# Patient Record
Sex: FEMALE | Race: ASIAN | NOT HISPANIC OR LATINO
[De-identification: names, ages, dates, MRNs, and addresses within clinical notes are randomized per-mention and may not be internally consistent; named-entity substitution may affect disease eponyms.]

---

## 2024-09-12 ENCOUNTER — NON-APPOINTMENT (OUTPATIENT)
Age: 24
End: 2024-09-12

## 2024-09-12 PROBLEM — Z00.00 ENCOUNTER FOR PREVENTIVE HEALTH EXAMINATION: Status: ACTIVE | Noted: 2024-09-12

## 2024-09-13 ENCOUNTER — APPOINTMENT (OUTPATIENT)
Dept: OBGYN | Facility: CLINIC | Age: 24
End: 2024-09-13

## 2024-09-13 ENCOUNTER — ASOB RESULT (OUTPATIENT)
Age: 24
End: 2024-09-13

## 2024-09-13 VITALS
DIASTOLIC BLOOD PRESSURE: 66 MMHG | HEIGHT: 63 IN | SYSTOLIC BLOOD PRESSURE: 100 MMHG | BODY MASS INDEX: 30.48 KG/M2 | WEIGHT: 172 LBS | HEART RATE: 76 BPM

## 2024-09-13 DIAGNOSIS — Z83.438 FAMILY HISTORY OF OTHER DISORDER OF LIPOPROTEIN METABOLISM AND OTHER LIPIDEMIA: ICD-10-CM

## 2024-09-13 DIAGNOSIS — Z83.1 FAMILY HISTORY OF OTHER INFECTIOUS AND PARASITIC DISEASES: ICD-10-CM

## 2024-09-13 DIAGNOSIS — Z87.42 PERSONAL HISTORY OF OTHER DISEASES OF THE FEMALE GENITAL TRACT: ICD-10-CM

## 2024-09-13 DIAGNOSIS — N76.0 ACUTE VAGINITIS: ICD-10-CM

## 2024-09-13 DIAGNOSIS — Z78.9 OTHER SPECIFIED HEALTH STATUS: ICD-10-CM

## 2024-09-13 DIAGNOSIS — Z83.3 FAMILY HISTORY OF DIABETES MELLITUS: ICD-10-CM

## 2024-09-13 DIAGNOSIS — Z82.49 FAMILY HISTORY OF ISCHEMIC HEART DISEASE AND OTHER DISEASES OF THE CIRCULATORY SYSTEM: ICD-10-CM

## 2024-09-13 DIAGNOSIS — Z34.90 ENCOUNTER FOR SUPERVISION OF NORMAL PREGNANCY, UNSPECIFIED, UNSPECIFIED TRIMESTER: ICD-10-CM

## 2024-09-13 DIAGNOSIS — Z82.3 FAMILY HISTORY OF STROKE: ICD-10-CM

## 2024-09-13 PROCEDURE — ZZZZZ: CPT

## 2024-09-13 PROCEDURE — G0444 DEPRESSION SCREEN ANNUAL: CPT | Mod: 59

## 2024-09-13 PROCEDURE — 76801 OB US < 14 WKS SINGLE FETUS: CPT

## 2024-09-13 PROCEDURE — 99385 PREV VISIT NEW AGE 18-39: CPT | Mod: 25

## 2024-09-13 PROCEDURE — 99203 OFFICE O/P NEW LOW 30 MIN: CPT | Mod: 25

## 2024-09-13 RX ORDER — ESTRADIOL 10 UG/1
TABLET, FILM COATED VAGINAL
Refills: 0 | Status: ACTIVE | COMMUNITY

## 2024-09-13 RX ORDER — RANITIDINE 150 MG/1
TABLET, FILM COATED ORAL
Refills: 0 | Status: ACTIVE | COMMUNITY

## 2024-09-13 RX ORDER — PNV/FERROUS SULFATE/FOLIC ACID 27-<0.5MG
TABLET ORAL
Refills: 0 | Status: ACTIVE | COMMUNITY

## 2024-09-13 RX ORDER — PROGESTERONE 50 MG/ML
INJECTION, SOLUTION INTRAMUSCULAR
Refills: 0 | Status: ACTIVE | COMMUNITY

## 2024-09-14 ENCOUNTER — TRANSCRIPTION ENCOUNTER (OUTPATIENT)
Age: 24
End: 2024-09-14

## 2024-09-16 ENCOUNTER — NON-APPOINTMENT (OUTPATIENT)
Age: 24
End: 2024-09-16

## 2024-09-16 LAB
ABO + RH PNL BLD: NORMAL
BACTERIA UR CULT: NORMAL
BILIRUB UR QL STRIP: NORMAL
BLD GP AB SCN SERPL QL: NORMAL
CLARITY UR: CLEAR
CMV IGM SERPL QL: <8 AU/ML
CMV IGM SERPL QL: NEGATIVE
COLLECTION METHOD: NORMAL
ESTIMATED AVERAGE GLUCOSE: 103 MG/DL
GLUCOSE UR-MCNC: NORMAL
HBA1C MFR BLD HPLC: 5.2 %
HBV SURFACE AG SER QL: NONREACTIVE
HCG UR QL: 0.2 EU/DL
HCV AB SER QL: NONREACTIVE
HCV S/CO RATIO: 0.12 S/CO
HGB A MFR BLD: 97.2 %
HGB A2 MFR BLD: 2.8 %
HGB FRACT BLD-IMP: NORMAL
HGB UR QL STRIP.AUTO: NORMAL
HIV1+2 AB SPEC QL IA.RAPID: NONREACTIVE
KETONES UR-MCNC: NORMAL
LEAD BLD-MCNC: <1 UG/DL
LEUKOCYTE ESTERASE UR QL STRIP: NORMAL
MEV IGG FLD QL IA: 266 AU/ML
MEV IGG+IGM SER-IMP: POSITIVE
MUV AB SER-ACNC: POSITIVE
MUV IGG SER QL IA: 91.3 AU/ML
NITRITE UR QL STRIP: NORMAL
PH UR STRIP: 6
PROT UR STRIP-MCNC: NORMAL
RUBV IGG FLD-ACNC: 3.43 INDEX
RUBV IGG SER-IMP: POSITIVE
SP GR UR STRIP: 1.01
T GONDII AB SER-IMP: NEGATIVE
T GONDII AB SER-IMP: NEGATIVE
T GONDII IGG SER QL: <3 IU/ML
T GONDII IGM SER QL: <3 AU/ML
T PALLIDUM AB SER QL IA: NEGATIVE
TSH SERPL-ACNC: 1.67 UIU/ML
VZV AB TITR SER: POSITIVE
VZV IGG SER IF-ACNC: 7.83 S/CO

## 2024-09-17 ENCOUNTER — NON-APPOINTMENT (OUTPATIENT)
Age: 24
End: 2024-09-17

## 2024-09-17 LAB
A VAGINAE DNA VAG QL NAA+PROBE: NORMAL
BVAB2 DNA VAG QL NAA+PROBE: NORMAL
C KRUSEI DNA VAG QL NAA+PROBE: NEGATIVE
C TRACH RRNA SPEC QL NAA+PROBE: NEGATIVE
C TRACH RRNA SPEC QL NAA+PROBE: NOT DETECTED
CANDIDA DNA VAG QL NAA+PROBE: NEGATIVE
MEGA1 DNA VAG QL NAA+PROBE: NORMAL
N GONORRHOEA RRNA SPEC QL NAA+PROBE: NEGATIVE
N GONORRHOEA RRNA SPEC QL NAA+PROBE: NOT DETECTED
SOURCE AMPLIFICATION: NORMAL
T VAGINALIS RRNA SPEC QL NAA+PROBE: NEGATIVE

## 2024-09-18 LAB
25(OH)D3 SERPL-MCNC: 19.3 NG/ML
ALBUMIN SERPL ELPH-MCNC: 4.5 G/DL
ALP BLD-CCNC: 61 U/L
ALT SERPL-CCNC: 42 U/L
ANION GAP SERPL CALC-SCNC: 14 MMOL/L
AST SERPL-CCNC: 27 U/L
B19V IGG SER QL IA: 4.57 INDEX
B19V IGG+IGM SER-IMP: NORMAL
B19V IGG+IGM SER-IMP: POSITIVE
B19V IGM FLD-ACNC: 0.32 INDEX
B19V IGM SER-ACNC: NEGATIVE
BASOPHILS # BLD AUTO: 0.03 K/UL
BASOPHILS NFR BLD AUTO: 0.3 %
BILIRUB SERPL-MCNC: 0.4 MG/DL
BUN SERPL-MCNC: 12 MG/DL
CALCIUM SERPL-MCNC: 9.9 MG/DL
CHLORIDE SERPL-SCNC: 101 MMOL/L
CMV IGG SERPL QL: 8.3 U/ML
CMV IGG SERPL-IMP: POSITIVE
CO2 SERPL-SCNC: 21 MMOL/L
CREAT SERPL-MCNC: 0.61 MG/DL
EGFR: 127 ML/MIN/1.73M2
EOSINOPHIL # BLD AUTO: 0.29 K/UL
EOSINOPHIL NFR BLD AUTO: 2.7 %
GLUCOSE SERPL-MCNC: 81 MG/DL
HCT VFR BLD CALC: 36.4 %
HGB BLD-MCNC: 11.9 G/DL
IMM GRANULOCYTES NFR BLD AUTO: 0.3 %
LYMPHOCYTES # BLD AUTO: 2.49 K/UL
LYMPHOCYTES NFR BLD AUTO: 23.3 %
MAN DIFF?: NORMAL
MCHC RBC-ENTMCNC: 29.3 PG
MCHC RBC-ENTMCNC: 32.7 GM/DL
MCV RBC AUTO: 89.7 FL
MONOCYTES # BLD AUTO: 0.57 K/UL
MONOCYTES NFR BLD AUTO: 5.3 %
NEUTROPHILS # BLD AUTO: 7.27 K/UL
NEUTROPHILS NFR BLD AUTO: 68.1 %
PLATELET # BLD AUTO: 294 K/UL
POTASSIUM SERPL-SCNC: 4.6 MMOL/L
PROT SERPL-MCNC: 7.3 G/DL
RBC # BLD: 4.06 M/UL
RBC # FLD: 13 %
SODIUM SERPL-SCNC: 135 MMOL/L
WBC # FLD AUTO: 10.68 K/UL

## 2024-09-19 LAB
AR GENE MUT ANL BLD/T: NORMAL
M TB IFN-G BLD-IMP: POSITIVE
QUANTIFERON TB PLUS MITOGEN MINUS NIL: 8.11 IU/ML
QUANTIFERON TB PLUS NIL: 0.02 IU/ML
QUANTIFERON TB PLUS TB1 MINUS NIL: 0.32 IU/ML
QUANTIFERON TB PLUS TB2 MINUS NIL: 0.44 IU/ML

## 2024-09-20 LAB
CFTR MUT TESTED BLD/T: NEGATIVE
FMR1 GENE MUT ANL BLD/T: NORMAL

## 2024-09-22 PROBLEM — Z83.1 FAMILY HISTORY OF TYPE B VIRAL HEPATITIS: Status: ACTIVE | Noted: 2024-09-13

## 2024-09-22 PROBLEM — Z78.9 DOES NOT USE ILLICIT DRUGS: Status: ACTIVE | Noted: 2024-09-13

## 2024-09-22 PROBLEM — Z78.9 NON-SMOKER: Status: ACTIVE | Noted: 2024-09-13

## 2024-09-22 PROBLEM — Z83.3 FAMILY HISTORY OF DIABETES MELLITUS: Status: ACTIVE | Noted: 2024-09-13

## 2024-09-22 PROBLEM — Z83.438 FAMILY HISTORY OF HYPERLIPIDEMIA: Status: ACTIVE | Noted: 2024-09-13

## 2024-09-22 PROBLEM — Z78.9 DENIES ALCOHOL CONSUMPTION: Status: ACTIVE | Noted: 2024-09-13

## 2024-09-22 PROBLEM — Z82.3 FAMILY HISTORY OF CEREBROVASCULAR ACCIDENT (CVA): Status: ACTIVE | Noted: 2024-09-13

## 2024-09-22 PROBLEM — Z82.49 FAMILY HISTORY OF HYPERTENSION: Status: ACTIVE | Noted: 2024-09-13

## 2024-09-22 PROBLEM — Z87.42 HISTORY OF DYSMENORRHEA: Status: RESOLVED | Noted: 2024-09-13 | Resolved: 2024-09-22

## 2024-09-22 NOTE — PHYSICAL EXAM
[Appropriately responsive] : appropriately responsive [Alert] : alert [No Acute Distress] : no acute distress [No Lymphadenopathy] : no lymphadenopathy [Regular Rate Rhythm] : regular rate rhythm [No Murmurs] : no murmurs [Clear to Auscultation B/L] : clear to auscultation bilaterally [Soft] : soft [Non-tender] : non-tender [Non-distended] : non-distended [Oriented x3] : oriented x3 [Examination Of The Breasts] : a normal appearance [Breast Palpation Diffuse Fibrous Tissue Bilateral] : fibrocystic changes [No Masses] : no breast masses were palpable [Labia Majora] : normal [Discharge] : a  ~M vaginal discharge was present [Scant] : scant [White] : white [Thin] : thin [Normal] : normal [Uterine Adnexae] : non-palpable [FreeTextEntry6] : no masses no tenderness no adenopathy.

## 2024-09-22 NOTE — PHYSICAL EXAM
[Appropriately responsive] : appropriately responsive [Alert] : alert [No Acute Distress] : no acute distress [No Lymphadenopathy] : no lymphadenopathy [Regular Rate Rhythm] : regular rate rhythm [No Murmurs] : no murmurs [Clear to Auscultation B/L] : clear to auscultation bilaterally [Soft] : soft [Non-tender] : non-tender [Non-distended] : non-distended [Oriented x3] : oriented x3 [Breast Palpation Diffuse Fibrous Tissue Bilateral] : fibrocystic changes [Examination Of The Breasts] : a normal appearance [No Masses] : no breast masses were palpable [Labia Majora] : normal [Discharge] : a  ~M vaginal discharge was present [Scant] : scant [White] : white [Thin] : thin [Normal] : normal [Uterine Adnexae] : non-palpable [FreeTextEntry6] : no masses no tenderness no adenopathy.

## 2024-09-22 NOTE — PLAN
[FreeTextEntry1] : Patient for initial visit  IVF pregancy TV sonogram showed (+) Fetal viability 8 wks 4 d and TFT=189  s/p Left salpingectomy and told normal Rt tube then noted blocked 2024 and IVF pregnancy. Patient screened for depression - no signs of clinical depression. Reviewed over the course of the visit 5-10 minutes of face-to-face time. PHQ-2 on file. Follow up in 2-4 weeks to check FH  Diet discussion- Include green vegetables, avoid uncooked food, avoid raw meat and fish and told may continue Pepsi for acid reflux.  The patient has a history of adhesions, we discussed a vaginal delivery will be ideal and to do  only if needed and indicated, not electively.

## 2024-09-22 NOTE — HISTORY OF PRESENT ILLNESS
[Patient reported PAP Smear was normal] : Patient reported PAP Smear was normal [N] : Patient does not use contraception [Y] : Positive pregnancy history [No] : Patient does not have concerns regarding sex [Currently Active] : currently active [Men] : men [TextBox_4] : 25 yo presents initial visit. Her current issue is nausea. She has been on Pepsi for acid reflux. She has a past history of laparoscopic procedure for ectopic pregnancy in 2023 with post-surgical complication of bowel obstruction, requiring another surgery. Results revealed adhesions around the liver. She is  and currently unemployed due to pregnancy. IVF pregnancy. need to check for fetal viability  [LMPDate] : 7/16/24 [PapSmeardate] : 7/20/23 [PGxTotal] : 1 [PGxEctopic] : 1 [FreeTextEntry1] : 7/16/24

## 2024-09-22 NOTE — PLAN
[FreeTextEntry1] : Patient for initial visit  IVF pregancy TV sonogram showed (+) Fetal viability 8 wks 4 d and PDV=937  s/p Left salpingectomy and told normal Rt tube then noted blocked 2024 and IVF pregnancy. Patient screened for depression - no signs of clinical depression. Reviewed over the course of the visit 5-10 minutes of face-to-face time. PHQ-2 on file. Follow up in 2-4 weeks to check FH  Diet discussion- Include green vegetables, avoid uncooked food, avoid raw meat and fish and told may continue Pepsi for acid reflux.  The patient has a history of adhesions, we discussed a vaginal delivery will be ideal and to do  only if needed and indicated, not electively.

## 2024-09-25 DIAGNOSIS — R76.12 NONSPECIFIC REACTION TO CELL MEDIATED IMMUNITY MEASUREMENT OF GAMMA INTERFERON ANTIGEN RESPONSE W/OUT ACTIVE TUBERCULOSIS: ICD-10-CM

## 2024-10-02 ENCOUNTER — APPOINTMENT (OUTPATIENT)
Dept: OBGYN | Facility: CLINIC | Age: 24
End: 2024-10-02

## 2024-10-02 VITALS
HEART RATE: 92 BPM | DIASTOLIC BLOOD PRESSURE: 71 MMHG | WEIGHT: 172 LBS | SYSTOLIC BLOOD PRESSURE: 106 MMHG | BODY MASS INDEX: 30.48 KG/M2 | HEIGHT: 63 IN

## 2024-10-02 PROCEDURE — 0502F SUBSEQUENT PRENATAL CARE: CPT

## 2024-10-02 PROCEDURE — 99213 OFFICE O/P EST LOW 20 MIN: CPT

## 2024-10-11 ENCOUNTER — LABORATORY RESULT (OUTPATIENT)
Age: 24
End: 2024-10-11

## 2024-10-11 ENCOUNTER — ASOB RESULT (OUTPATIENT)
Age: 24
End: 2024-10-11

## 2024-10-11 ENCOUNTER — APPOINTMENT (OUTPATIENT)
Dept: ANTEPARTUM | Facility: CLINIC | Age: 24
End: 2024-10-11
Payer: COMMERCIAL

## 2024-10-11 PROCEDURE — 76801 OB US < 14 WKS SINGLE FETUS: CPT

## 2024-10-11 PROCEDURE — 36415 COLL VENOUS BLD VENIPUNCTURE: CPT

## 2024-10-11 PROCEDURE — 76813 OB US NUCHAL MEAS 1 GEST: CPT

## 2024-10-21 ENCOUNTER — NON-APPOINTMENT (OUTPATIENT)
Age: 24
End: 2024-10-21

## 2024-10-30 ENCOUNTER — NON-APPOINTMENT (OUTPATIENT)
Age: 24
End: 2024-10-30

## 2024-10-30 ENCOUNTER — APPOINTMENT (OUTPATIENT)
Dept: PULMONOLOGY | Facility: CLINIC | Age: 24
End: 2024-10-30
Payer: COMMERCIAL

## 2024-10-30 VITALS — DIASTOLIC BLOOD PRESSURE: 76 MMHG | SYSTOLIC BLOOD PRESSURE: 112 MMHG | OXYGEN SATURATION: 99 % | HEART RATE: 89 BPM

## 2024-10-30 DIAGNOSIS — R76.12 NONSPECIFIC REACTION TO CELL MEDIATED IMMUNITY MEASUREMENT OF GAMMA INTERFERON ANTIGEN RESPONSE W/OUT ACTIVE TUBERCULOSIS: ICD-10-CM

## 2024-10-30 PROCEDURE — 71045 X-RAY EXAM CHEST 1 VIEW: CPT

## 2024-10-30 PROCEDURE — 99204 OFFICE O/P NEW MOD 45 MIN: CPT

## 2024-11-06 ENCOUNTER — APPOINTMENT (OUTPATIENT)
Dept: OBGYN | Facility: CLINIC | Age: 24
End: 2024-11-06
Payer: COMMERCIAL

## 2024-11-06 VITALS
SYSTOLIC BLOOD PRESSURE: 98 MMHG | BODY MASS INDEX: 27.32 KG/M2 | WEIGHT: 170 LBS | HEART RATE: 81 BPM | HEIGHT: 66 IN | DIASTOLIC BLOOD PRESSURE: 65 MMHG

## 2024-11-06 DIAGNOSIS — Z3A.16 16 WEEKS GESTATION OF PREGNANCY: ICD-10-CM

## 2024-11-06 PROCEDURE — 99213 OFFICE O/P EST LOW 20 MIN: CPT

## 2024-11-06 PROCEDURE — 0502F SUBSEQUENT PRENATAL CARE: CPT

## 2024-11-11 ENCOUNTER — NON-APPOINTMENT (OUTPATIENT)
Age: 24
End: 2024-11-11

## 2024-11-11 LAB
AFP INTERP SERPL-IMP: NORMAL
AFP INTERP SERPL-IMP: NORMAL
AFP MOM CUT-OFF: 2.5
AFP MOM SERPL: 1.68
AFP PERCENTILE: 94.7
AFP SERPL-ACNC: 53.87 NG/ML
CARBAMAZEPINE?: NO
CURRENT SMOKER: NORMAL
DIABETES STATUS PATIENT: NORMAL
GA: NORMAL
GESTATIONAL AGE METHOD: NORMAL
HX OF NTD NARR: NORMAL
MULTIPLE PREGNANCY: NORMAL
NEURAL TUBE DEFECT RISK FETUS: NORMAL
NEURAL TUBE DEFECT RISK POP: NORMAL
RECOM F/U: NO
TEST PERFORMANCE INFO SPEC: NORMAL
VALPROIC ACID?: NORMAL

## 2024-12-04 ENCOUNTER — APPOINTMENT (OUTPATIENT)
Dept: OBGYN | Facility: CLINIC | Age: 24
End: 2024-12-04
Payer: COMMERCIAL

## 2024-12-04 ENCOUNTER — ASOB RESULT (OUTPATIENT)
Age: 24
End: 2024-12-04

## 2024-12-04 ENCOUNTER — APPOINTMENT (OUTPATIENT)
Dept: OBGYN | Facility: CLINIC | Age: 24
End: 2024-12-04

## 2024-12-04 VITALS
HEART RATE: 79 BPM | DIASTOLIC BLOOD PRESSURE: 66 MMHG | SYSTOLIC BLOOD PRESSURE: 104 MMHG | HEIGHT: 66 IN | WEIGHT: 176 LBS | BODY MASS INDEX: 28.28 KG/M2

## 2024-12-04 PROCEDURE — 0502F SUBSEQUENT PRENATAL CARE: CPT

## 2024-12-04 PROCEDURE — 76817 TRANSVAGINAL US OBSTETRIC: CPT

## 2024-12-04 PROCEDURE — 76815 OB US LIMITED FETUS(S): CPT | Mod: 59

## 2024-12-06 ENCOUNTER — APPOINTMENT (OUTPATIENT)
Dept: ANTEPARTUM | Facility: CLINIC | Age: 24
End: 2024-12-06
Payer: COMMERCIAL

## 2024-12-06 ENCOUNTER — ASOB RESULT (OUTPATIENT)
Age: 24
End: 2024-12-06

## 2024-12-06 PROCEDURE — 76811 OB US DETAILED SNGL FETUS: CPT

## 2024-12-17 ENCOUNTER — APPOINTMENT (OUTPATIENT)
Dept: ANTEPARTUM | Facility: CLINIC | Age: 24
End: 2024-12-17

## 2024-12-17 ENCOUNTER — ASOB RESULT (OUTPATIENT)
Age: 24
End: 2024-12-17

## 2024-12-17 PROCEDURE — 93325 DOPPLER ECHO COLOR FLOW MAPG: CPT

## 2024-12-17 PROCEDURE — 76825 ECHO EXAM OF FETAL HEART: CPT

## 2024-12-17 PROCEDURE — 76827 ECHO EXAM OF FETAL HEART: CPT

## 2024-12-19 ENCOUNTER — APPOINTMENT (OUTPATIENT)
Dept: OBGYN | Facility: CLINIC | Age: 24
End: 2024-12-19
Payer: COMMERCIAL

## 2024-12-19 VITALS
SYSTOLIC BLOOD PRESSURE: 106 MMHG | HEART RATE: 78 BPM | WEIGHT: 177 LBS | DIASTOLIC BLOOD PRESSURE: 84 MMHG | BODY MASS INDEX: 28.57 KG/M2

## 2024-12-19 DIAGNOSIS — Z3A.22 22 WEEKS GESTATION OF PREGNANCY: ICD-10-CM

## 2024-12-19 PROCEDURE — 36415 COLL VENOUS BLD VENIPUNCTURE: CPT

## 2024-12-20 ENCOUNTER — NON-APPOINTMENT (OUTPATIENT)
Age: 24
End: 2024-12-20

## 2024-12-20 LAB — GLUCOSE 1H P 50 G GLC PO SERPL-MCNC: 102 MG/DL

## 2024-12-30 ENCOUNTER — APPOINTMENT (OUTPATIENT)
Dept: OBGYN | Facility: CLINIC | Age: 24
End: 2024-12-30
Payer: COMMERCIAL

## 2024-12-30 VITALS
DIASTOLIC BLOOD PRESSURE: 75 MMHG | WEIGHT: 185 LBS | BODY MASS INDEX: 29.73 KG/M2 | SYSTOLIC BLOOD PRESSURE: 116 MMHG | HEART RATE: 93 BPM | HEIGHT: 66 IN

## 2024-12-30 DIAGNOSIS — M54.9 OTHER SPECIFIED DISEASES AND CONDITIONS COMPLICATING PREGNANCY: ICD-10-CM

## 2024-12-30 DIAGNOSIS — O99.891 OTHER SPECIFIED DISEASES AND CONDITIONS COMPLICATING PREGNANCY: ICD-10-CM

## 2024-12-30 PROCEDURE — 0502F SUBSEQUENT PRENATAL CARE: CPT

## 2025-01-06 LAB
APPEARANCE: CLEAR
BACTERIA UR CULT: NORMAL
BACTERIA: ABNORMAL /HPF
BILIRUBIN URINE: NEGATIVE
BLOOD URINE: NEGATIVE
CAST: 0 /LPF
COLOR: YELLOW
EPITHELIAL CELLS: 3 /HPF
GLUCOSE QUALITATIVE U: NEGATIVE MG/DL
KETONES URINE: NEGATIVE MG/DL
LEUKOCYTE ESTERASE URINE: NEGATIVE
MICROSCOPIC-UA: NORMAL
NITRITE URINE: NEGATIVE
PH URINE: 6.5
PROTEIN URINE: NEGATIVE MG/DL
RED BLOOD CELLS URINE: 0 /HPF
SPECIFIC GRAVITY URINE: 1.02
UROBILINOGEN URINE: 0.2 MG/DL
WHITE BLOOD CELLS URINE: 0 /HPF

## 2025-01-13 ENCOUNTER — NON-APPOINTMENT (OUTPATIENT)
Age: 25
End: 2025-01-13

## 2025-01-13 ENCOUNTER — OUTPATIENT (OUTPATIENT)
Dept: INPATIENT UNIT | Facility: HOSPITAL | Age: 25
LOS: 1 days | Discharge: HOME | End: 2025-01-13
Payer: COMMERCIAL

## 2025-01-13 ENCOUNTER — APPOINTMENT (OUTPATIENT)
Dept: ANTEPARTUM | Facility: CLINIC | Age: 25
End: 2025-01-13

## 2025-01-13 VITALS — HEART RATE: 81 BPM | DIASTOLIC BLOOD PRESSURE: 58 MMHG | SYSTOLIC BLOOD PRESSURE: 107 MMHG

## 2025-01-13 VITALS
HEART RATE: 88 BPM | DIASTOLIC BLOOD PRESSURE: 63 MMHG | TEMPERATURE: 98 F | SYSTOLIC BLOOD PRESSURE: 105 MMHG | RESPIRATION RATE: 16 BRPM

## 2025-01-13 DIAGNOSIS — Z90.79 ACQUIRED ABSENCE OF OTHER GENITAL ORGAN(S): Chronic | ICD-10-CM

## 2025-01-13 DIAGNOSIS — O26.899 OTHER SPECIFIED PREGNANCY RELATED CONDITIONS, UNSPECIFIED TRIMESTER: ICD-10-CM

## 2025-01-13 PROCEDURE — 99212 OFFICE O/P EST SF 10 MIN: CPT

## 2025-01-13 NOTE — OB PROVIDER TRIAGE NOTE - HISTORY OF PRESENT ILLNESS
25yo  @ 26.1 presents with c/o decreased fetal movement x 3 days. Reports fetus is moving but not as much as usual. Denies LOF, VB, ctx and reports GFM.   IVF pregnancy    H/O Ectopic with left salpingectomy

## 2025-01-13 NOTE — OB PROVIDER TRIAGE NOTE - NSHPPHYSICALEXAM_GEN_ALL_CORE
Assessment reveals VSS, abdomen soft, NT, gravid.   Cat 1 FHT, no ctx on toco Assessment reveals VSS, abdomen soft, NT, gravid.   Cat 1 FHT, no ctx on toco  Limited US performed - breech, anterior placenta, MVP 4.5-saved in asob     Reports feeling GFM at this time

## 2025-01-13 NOTE — OB PROVIDER TRIAGE NOTE - NSOBPROVIDERNOTE_OBGYN_ALL_OB_FT
Plan D/W Dr. Shoemaker, no evidence of acute process at this time. Normal fetal testing.   Fetal kick counts reviewed.   Follow up as scheduled.

## 2025-01-17 DIAGNOSIS — Z3A.26 26 WEEKS GESTATION OF PREGNANCY: ICD-10-CM

## 2025-01-17 DIAGNOSIS — O09.812 SUPERVISION OF PREGNANCY RESULTING FROM ASSISTED REPRODUCTIVE TECHNOLOGY, SECOND TRIMESTER: ICD-10-CM

## 2025-01-17 DIAGNOSIS — O36.8120 DECREASED FETAL MOVEMENTS, SECOND TRIMESTER, NOT APPLICABLE OR UNSPECIFIED: ICD-10-CM

## 2025-01-17 DIAGNOSIS — O09.12 SUPERVISION OF PREGNANCY WITH HISTORY OF ECTOPIC PREGNANCY, SECOND TRIMESTER: ICD-10-CM

## 2025-01-29 ENCOUNTER — APPOINTMENT (OUTPATIENT)
Dept: OBGYN | Facility: CLINIC | Age: 25
End: 2025-01-29

## 2025-01-29 PROCEDURE — 0502F SUBSEQUENT PRENATAL CARE: CPT

## 2025-01-30 ENCOUNTER — NON-APPOINTMENT (OUTPATIENT)
Age: 25
End: 2025-01-30

## 2025-01-30 PROBLEM — Z78.9 OTHER SPECIFIED HEALTH STATUS: Chronic | Status: ACTIVE | Noted: 2025-01-13

## 2025-02-03 ENCOUNTER — ASOB RESULT (OUTPATIENT)
Age: 25
End: 2025-02-03

## 2025-02-03 ENCOUNTER — APPOINTMENT (OUTPATIENT)
Dept: ANTEPARTUM | Facility: CLINIC | Age: 25
End: 2025-02-03
Payer: COMMERCIAL

## 2025-02-03 PROCEDURE — 76819 FETAL BIOPHYS PROFIL W/O NST: CPT | Mod: 59

## 2025-02-03 PROCEDURE — 76816 OB US FOLLOW-UP PER FETUS: CPT

## 2025-02-19 ENCOUNTER — APPOINTMENT (OUTPATIENT)
Dept: OBGYN | Facility: CLINIC | Age: 25
End: 2025-02-19

## 2025-02-19 VITALS
WEIGHT: 193 LBS | BODY MASS INDEX: 31.02 KG/M2 | HEART RATE: 92 BPM | DIASTOLIC BLOOD PRESSURE: 76 MMHG | SYSTOLIC BLOOD PRESSURE: 119 MMHG | HEIGHT: 66 IN

## 2025-02-19 PROCEDURE — 0502F SUBSEQUENT PRENATAL CARE: CPT

## 2025-03-05 ENCOUNTER — APPOINTMENT (OUTPATIENT)
Dept: OBGYN | Facility: CLINIC | Age: 25
End: 2025-03-05
Payer: COMMERCIAL

## 2025-03-05 VITALS
BODY MASS INDEX: 31.18 KG/M2 | DIASTOLIC BLOOD PRESSURE: 79 MMHG | HEART RATE: 94 BPM | SYSTOLIC BLOOD PRESSURE: 122 MMHG | HEIGHT: 66 IN | WEIGHT: 194 LBS

## 2025-03-05 PROCEDURE — 90715 TDAP VACCINE 7 YRS/> IM: CPT

## 2025-03-05 PROCEDURE — 90471 IMMUNIZATION ADMIN: CPT

## 2025-03-05 PROCEDURE — 0502F SUBSEQUENT PRENATAL CARE: CPT

## 2025-03-05 PROCEDURE — 90472 IMMUNIZATION ADMIN EACH ADD: CPT

## 2025-03-05 PROCEDURE — 90678 RSV VACC PREF BIVALENT IM: CPT

## 2025-03-07 ENCOUNTER — APPOINTMENT (OUTPATIENT)
Dept: ANTEPARTUM | Facility: CLINIC | Age: 25
End: 2025-03-07
Payer: COMMERCIAL

## 2025-03-07 ENCOUNTER — ASOB RESULT (OUTPATIENT)
Age: 25
End: 2025-03-07

## 2025-03-07 PROCEDURE — 76816 OB US FOLLOW-UP PER FETUS: CPT

## 2025-03-07 PROCEDURE — 76819 FETAL BIOPHYS PROFIL W/O NST: CPT | Mod: 59

## 2025-03-17 ENCOUNTER — APPOINTMENT (OUTPATIENT)
Dept: OBGYN | Facility: CLINIC | Age: 25
End: 2025-03-17

## 2025-03-17 ENCOUNTER — NON-APPOINTMENT (OUTPATIENT)
Age: 25
End: 2025-03-17

## 2025-03-17 VITALS
HEART RATE: 99 BPM | WEIGHT: 194 LBS | DIASTOLIC BLOOD PRESSURE: 76 MMHG | HEIGHT: 66 IN | BODY MASS INDEX: 31.18 KG/M2 | SYSTOLIC BLOOD PRESSURE: 114 MMHG

## 2025-03-17 DIAGNOSIS — N39.0 URINARY TRACT INFECTION, SITE NOT SPECIFIED: ICD-10-CM

## 2025-03-17 DIAGNOSIS — Z3A.35 35 WEEKS GESTATION OF PREGNANCY: ICD-10-CM

## 2025-03-17 PROCEDURE — 0502F SUBSEQUENT PRENATAL CARE: CPT

## 2025-03-19 ENCOUNTER — NON-APPOINTMENT (OUTPATIENT)
Age: 25
End: 2025-03-19

## 2025-03-19 LAB — BACTERIA UR CULT: NORMAL

## 2025-03-25 ENCOUNTER — APPOINTMENT (OUTPATIENT)
Dept: OBGYN | Facility: CLINIC | Age: 25
End: 2025-03-25
Payer: COMMERCIAL

## 2025-03-25 ENCOUNTER — LABORATORY RESULT (OUTPATIENT)
Age: 25
End: 2025-03-25

## 2025-03-25 VITALS
WEIGHT: 198 LBS | HEART RATE: 116 BPM | DIASTOLIC BLOOD PRESSURE: 82 MMHG | HEIGHT: 66 IN | SYSTOLIC BLOOD PRESSURE: 126 MMHG | BODY MASS INDEX: 31.82 KG/M2

## 2025-03-25 LAB — B-HEM STREP SPEC QL CULT: NORMAL

## 2025-03-25 PROCEDURE — 59025 FETAL NON-STRESS TEST: CPT

## 2025-03-25 PROCEDURE — 0502F SUBSEQUENT PRENATAL CARE: CPT

## 2025-03-26 ENCOUNTER — NON-APPOINTMENT (OUTPATIENT)
Age: 25
End: 2025-03-26

## 2025-03-26 LAB — T PALLIDUM AB SER QL IA: NEGATIVE

## 2025-03-28 ENCOUNTER — NON-APPOINTMENT (OUTPATIENT)
Age: 25
End: 2025-03-28

## 2025-03-28 ENCOUNTER — APPOINTMENT (OUTPATIENT)
Dept: ANTEPARTUM | Facility: CLINIC | Age: 25
End: 2025-03-28
Payer: COMMERCIAL

## 2025-03-28 ENCOUNTER — ASOB RESULT (OUTPATIENT)
Age: 25
End: 2025-03-28

## 2025-03-28 ENCOUNTER — OUTPATIENT (OUTPATIENT)
Dept: INPATIENT UNIT | Facility: HOSPITAL | Age: 25
LOS: 1 days | Discharge: HOME | End: 2025-03-28
Payer: COMMERCIAL

## 2025-03-28 VITALS
HEART RATE: 92 BPM | RESPIRATION RATE: 16 BRPM | OXYGEN SATURATION: 100 % | TEMPERATURE: 98 F | SYSTOLIC BLOOD PRESSURE: 116 MMHG | DIASTOLIC BLOOD PRESSURE: 73 MMHG

## 2025-03-28 VITALS — DIASTOLIC BLOOD PRESSURE: 68 MMHG | SYSTOLIC BLOOD PRESSURE: 115 MMHG | HEART RATE: 81 BPM

## 2025-03-28 DIAGNOSIS — Z90.79 ACQUIRED ABSENCE OF OTHER GENITAL ORGAN(S): Chronic | ICD-10-CM

## 2025-03-28 DIAGNOSIS — O26.899 OTHER SPECIFIED PREGNANCY RELATED CONDITIONS, UNSPECIFIED TRIMESTER: ICD-10-CM

## 2025-03-28 DIAGNOSIS — Z87.19 PERSONAL HISTORY OF OTHER DISEASES OF THE DIGESTIVE SYSTEM: Chronic | ICD-10-CM

## 2025-03-28 DIAGNOSIS — Z36.9 ENCOUNTER FOR ANTENATAL SCREENING, UNSPECIFIED: ICD-10-CM

## 2025-03-28 LAB
ALBUMIN SERPL ELPH-MCNC: 3.8 G/DL — SIGNIFICANT CHANGE UP (ref 3.3–5)
ALP SERPL-CCNC: 196 U/L — HIGH (ref 40–120)
ALT FLD-CCNC: 9 U/L — SIGNIFICANT CHANGE UP (ref 4–33)
ANION GAP SERPL CALC-SCNC: 12 MMOL/L — SIGNIFICANT CHANGE UP (ref 7–14)
APPEARANCE UR: CLEAR — SIGNIFICANT CHANGE UP
AST SERPL-CCNC: 15 U/L — SIGNIFICANT CHANGE UP (ref 4–32)
BASOPHILS # BLD AUTO: 0.03 K/UL — SIGNIFICANT CHANGE UP (ref 0–0.2)
BASOPHILS NFR BLD AUTO: 0.3 % — SIGNIFICANT CHANGE UP (ref 0–2)
BILIRUB SERPL-MCNC: 0.2 MG/DL — SIGNIFICANT CHANGE UP (ref 0.2–1.2)
BILIRUB UR-MCNC: NEGATIVE — SIGNIFICANT CHANGE UP
BUN SERPL-MCNC: 12 MG/DL — SIGNIFICANT CHANGE UP (ref 7–23)
CALCIUM SERPL-MCNC: 9.3 MG/DL — SIGNIFICANT CHANGE UP (ref 8.4–10.5)
CHLORIDE SERPL-SCNC: 103 MMOL/L — SIGNIFICANT CHANGE UP (ref 98–107)
CO2 SERPL-SCNC: 21 MMOL/L — LOW (ref 22–31)
COLOR SPEC: YELLOW — SIGNIFICANT CHANGE UP
CREAT ?TM UR-MCNC: 119 MG/DL — SIGNIFICANT CHANGE UP
CREAT SERPL-MCNC: 0.48 MG/DL — LOW (ref 0.5–1.3)
DIFF PNL FLD: NEGATIVE — SIGNIFICANT CHANGE UP
EGFR: 135 ML/MIN/1.73M2 — SIGNIFICANT CHANGE UP
EGFR: 135 ML/MIN/1.73M2 — SIGNIFICANT CHANGE UP
EOSINOPHIL # BLD AUTO: 0.13 K/UL — SIGNIFICANT CHANGE UP (ref 0–0.5)
EOSINOPHIL NFR BLD AUTO: 1.3 % — SIGNIFICANT CHANGE UP (ref 0–6)
GLUCOSE SERPL-MCNC: 82 MG/DL — SIGNIFICANT CHANGE UP (ref 70–99)
GLUCOSE UR QL: NEGATIVE MG/DL — SIGNIFICANT CHANGE UP
HCT VFR BLD CALC: 32.5 % — LOW (ref 34.5–45)
HGB BLD-MCNC: 10.5 G/DL — LOW (ref 11.5–15.5)
IANC: 7.27 K/UL — SIGNIFICANT CHANGE UP (ref 1.8–7.4)
IMM GRANULOCYTES NFR BLD AUTO: 1 % — HIGH (ref 0–0.9)
KETONES UR-MCNC: NEGATIVE MG/DL — SIGNIFICANT CHANGE UP
LDH SERPL L TO P-CCNC: 146 U/L — SIGNIFICANT CHANGE UP (ref 135–225)
LEUKOCYTE ESTERASE UR-ACNC: NEGATIVE — SIGNIFICANT CHANGE UP
LYMPHOCYTES # BLD AUTO: 1.92 K/UL — SIGNIFICANT CHANGE UP (ref 1–3.3)
LYMPHOCYTES # BLD AUTO: 18.8 % — SIGNIFICANT CHANGE UP (ref 13–44)
MCHC RBC-ENTMCNC: 29.2 PG — SIGNIFICANT CHANGE UP (ref 27–34)
MCHC RBC-ENTMCNC: 32.3 G/DL — SIGNIFICANT CHANGE UP (ref 32–36)
MCV RBC AUTO: 90.3 FL — SIGNIFICANT CHANGE UP (ref 80–100)
MONOCYTES # BLD AUTO: 0.77 K/UL — SIGNIFICANT CHANGE UP (ref 0–0.9)
MONOCYTES NFR BLD AUTO: 7.5 % — SIGNIFICANT CHANGE UP (ref 2–14)
NEUTROPHILS # BLD AUTO: 7.27 K/UL — SIGNIFICANT CHANGE UP (ref 1.8–7.4)
NEUTROPHILS NFR BLD AUTO: 71.1 % — SIGNIFICANT CHANGE UP (ref 43–77)
NITRITE UR-MCNC: NEGATIVE — SIGNIFICANT CHANGE UP
NRBC # BLD AUTO: 0 K/UL — SIGNIFICANT CHANGE UP (ref 0–0)
NRBC # FLD: 0 K/UL — SIGNIFICANT CHANGE UP (ref 0–0)
NRBC BLD AUTO-RTO: 0 /100 WBCS — SIGNIFICANT CHANGE UP (ref 0–0)
PH UR: 6.5 — SIGNIFICANT CHANGE UP (ref 5–8)
PLATELET # BLD AUTO: 257 K/UL — SIGNIFICANT CHANGE UP (ref 150–400)
POTASSIUM SERPL-MCNC: 4.3 MMOL/L — SIGNIFICANT CHANGE UP (ref 3.5–5.3)
POTASSIUM SERPL-SCNC: 4.3 MMOL/L — SIGNIFICANT CHANGE UP (ref 3.5–5.3)
PROT ?TM UR-MCNC: 16 MG/DL — SIGNIFICANT CHANGE UP
PROT SERPL-MCNC: 7.5 G/DL — SIGNIFICANT CHANGE UP (ref 6–8.3)
PROT UR-MCNC: SIGNIFICANT CHANGE UP MG/DL
PROT/CREAT UR-RTO: 0.1 RATIO — SIGNIFICANT CHANGE UP (ref 0–0.2)
RBC # BLD: 3.6 M/UL — LOW (ref 3.8–5.2)
RBC # FLD: 13.8 % — SIGNIFICANT CHANGE UP (ref 10.3–14.5)
SODIUM SERPL-SCNC: 136 MMOL/L — SIGNIFICANT CHANGE UP (ref 135–145)
SP GR SPEC: 1.02 — SIGNIFICANT CHANGE UP (ref 1–1.03)
URATE SERPL-MCNC: 4 MG/DL — SIGNIFICANT CHANGE UP (ref 2.5–7)
UROBILINOGEN FLD QL: 0.2 MG/DL — SIGNIFICANT CHANGE UP (ref 0.2–1)
WBC # BLD: 10.22 K/UL — SIGNIFICANT CHANGE UP (ref 3.8–10.5)
WBC # FLD AUTO: 10.22 K/UL — SIGNIFICANT CHANGE UP (ref 3.8–10.5)

## 2025-03-28 PROCEDURE — 99203 OFFICE O/P NEW LOW 30 MIN: CPT

## 2025-03-28 PROCEDURE — 76815 OB US LIMITED FETUS(S): CPT | Mod: 26

## 2025-03-28 PROCEDURE — 76819 FETAL BIOPHYS PROFIL W/O NST: CPT | Mod: 26

## 2025-03-28 PROCEDURE — 59025 FETAL NON-STRESS TEST: CPT | Mod: 26

## 2025-03-28 PROCEDURE — 99222 1ST HOSP IP/OBS MODERATE 55: CPT | Mod: 25

## 2025-03-28 RX ORDER — ACETAMINOPHEN 500 MG/5ML
1000 LIQUID (ML) ORAL ONCE
Refills: 0 | Status: COMPLETED | OUTPATIENT
Start: 2025-03-28 | End: 2025-03-28

## 2025-03-28 RX ORDER — METOCLOPRAMIDE HCL 10 MG
10 TABLET ORAL ONCE
Refills: 0 | Status: COMPLETED | OUTPATIENT
Start: 2025-03-28 | End: 2025-03-28

## 2025-03-28 RX ORDER — DIPHENHYDRAMINE HCL 12.5MG/5ML
25 ELIXIR ORAL ONCE
Refills: 0 | Status: COMPLETED | OUTPATIENT
Start: 2025-03-28 | End: 2025-03-28

## 2025-03-28 RX ADMIN — Medication 3 MILLILITER(S): at 15:35

## 2025-03-28 RX ADMIN — Medication 1000 MILLIGRAM(S): at 15:45

## 2025-03-28 RX ADMIN — Medication 10 MILLIGRAM(S): at 14:45

## 2025-03-28 RX ADMIN — Medication 20 MILLIGRAM(S): at 14:35

## 2025-03-28 RX ADMIN — Medication 25 MILLIGRAM(S): at 14:41

## 2025-03-28 RX ADMIN — Medication 1000 MILLIGRAM(S): at 14:05

## 2025-03-28 NOTE — CONSULT NOTE ADULT - SUBJECTIVE AND OBJECTIVE BOX
Neurology - Consult Note    -  Spectra: [phone] ([facility]), [phone] ([facility])  -    HPI: Patient VERA FOX is a 24y (2000) wo/man with a PMHx significant for ***      Review of Systems:  INCOMPLETE   CONSTITUTIONAL: No fevers or chills  EYES AND ENT: No visual changes or no throat pain   NECK: No pain or stiffness  RESPIRATORY: No hemoptysis or shortness of breath  CARDIOVASCULAR: No chest pain or palpitations  GASTROINTESTINAL: No melena or hematochezia  GENITOURINARY: No dysuria or hematuria  NEUROLOGICAL: +As stated in HPI above  SKIN: No itching, burning, rashes, or lesions   All other review of systems is negative unless indicated above.    Allergies:  No Known Allergies      PMHx/PSHx/Family Hx: As above, otherwise see below   No pertinent past medical history        Social Hx:  No current use of tobacco, alcohol, or illicit drugs  Lives with ***    Medications:  MEDICATIONS  (STANDING):  sodium chloride 0.9% lock flush 3 milliLiter(s) IV Push every 8 hours    MEDICATIONS  (PRN):      Vitals:  T(C): 36.6 (03-28-25 @ 13:42), Max: 36.6 (03-28-25 @ 13:19)  HR: 81 (03-28-25 @ 17:36) (78 - 104)  BP: 115/68 (03-28-25 @ 17:36) (106/59 - 139/84)  RR: 16 (03-28-25 @ 13:42) (16 - 16)  SpO2: 98% (03-28-25 @ 15:53) (96% - 100%)    Physical Examination:  General - NAD, pleasant, cooperative   Cardiovascular - Peripheral pulses palpable, no edema  Neurologic Exam:  Mental status - Awake, Alert, Oriented to person, place, and time. Speech fluent, repetition and naming intact. Follows simple and complex commands. Attention/concentration, recent and remote memory (including registration and recall), and fund of knowledge intact    Cranial nerves:  CN II: Visual fields are full to confrontation. Fundoscopic exam not done. Pupils are 4 mm and briskly reactive to light.   CN III, IV, VI: EOMI, no nystagmus, no ptosis  CN V: Facial sensation is intact to light touch in all 3 divisions bilaterally.  CN VII: Face is symmetric with normal eye closure and smile.  CN VII: Hearing is normal to rubbing fingers  CN IX, X: Palate elevates symmetrically. Phonation is normal.  CN XI: Head turning and shoulder shrug are intact  CN XII: Tongue is midline with normal movements and no atrophy.    Motor - Normal bulk and tone throughout. No pronator drift of out-stretched arms.  Strength testing          R/L:   Deltoid(C5) 5/5  Biceps(C6) 5/5   Triceps(C7) 5/5    Wrist Extension 5/5   Wrist Flexion (C8) 5/5    Interossei  (T1) 5/5     5/5                      R/L: Hip Flexion(L2/3) 5/5   Hip Extension (L4/5) 5/5  Knee Flexion (L4/5/S1) 5/5   Knee Extension (L3/4) 5/5  Dorsiflexion (L4/5) 5/5  Plantar Flexion (S1) 5/5  Sensation - Light touch intact throughout    DTR's -             Biceps      Triceps     Brachioradialis      Patellar    Ankle    Toes/plantar response  R             2+             2+                  2+                2+            2+                 Down  L              2+             2+                 2+                 2+           2+                 Down    Coordination - Rapid alternating movements and fine finger movements are intact. There is no dysmetria on finger-to-nose and heel-knee-shin. There are no abnormal or extraneous movements.   Romberg- not done due to pregnancy and safety concern    Gait and station - not done due to pregnancy and safety concern      Labs:                        10.5   10.22 )-----------( 257      ( 28 Mar 2025 14:25 )             32.5     03-28    136  |  103  |  12  ----------------------------<  82  4.3   |  21[L]  |  0.48[L]    Ca    9.3      28 Mar 2025 14:25    TPro  7.5  /  Alb  3.8  /  TBili  0.2  /  DBili  x   /  AST  15  /  ALT  9   /  AlkPhos  196[H]  03-28    CAPILLARY BLOOD GLUCOSE        LIVER FUNCTIONS - ( 28 Mar 2025 14:25 )  Alb: 3.8 g/dL / Pro: 7.5 g/dL / ALK PHOS: 196 U/L / ALT: 9 U/L / AST: 15 U/L / GGT: x               CSF:                  Radiology:     Neurology - Consult Note    -  Spectra: [phone] ([facility]), [id number] ([facility])  -    HPI: Patient VERA FOX is a 24y (2000) right handed woman with  @ 36.5 wks gestation presented Left-sided headache which started about 3 days ago.  As per the patient she started to have this headache on the left side about 3 days ago, initially the headache was 6-7 out of 10 in severity and was responding well to Tylenol, but for last 1 day the headache got worse 9 out of 10 in severity and was poorly responding to the Tylenol.  The headache is throbbing and pulsating in nature, not associate with any nausea, vomiting, photophobia, phonophobia or any visual changes.  There is no worsening of the headache with coughing bending forward or lying flat.  But if she lies on her right side she feels better than lying on her left side.  Patient denied any dizziness, vertigo, any weakness or numbness tingling in the limbs.  In the past patient is to have tension type headache which was dull aching in nature.  But she never had any migraine like headache in the past.  Patient have history of neck pain with radiation to the left shoulder and arms for which she visited multiple doctors and was diagnosed with possible cervical spondylosis.  But for last 1 year she was not having any neck pain.  Now the pain starts in the back of her left eye and goes all the way to the back of her head.  She feels better if she compresses the left temple.  After coming to the hospital she was given migraine cocktail with Reglan and Tylenol and her headache got better.  During the time of the encounter that she was complaining about very mild headache 2-3/10 in severity only.        Review of Systems:   CONSTITUTIONAL: No fevers or chills  EYES AND ENT: No visual changes or no throat pain   NECK: No pain or stiffness  RESPIRATORY: No hemoptysis or shortness of breath  CARDIOVASCULAR: No chest pain or palpitations  GASTROINTESTINAL: No melena or hematochezia  GENITOURINARY: No dysuria or hematuria  NEUROLOGICAL: +As stated in HPI above  SKIN: No itching, burning, rashes, or lesions   All other review of systems is negative unless indicated above.    Allergies:  No Known Allergies      PMHx/PSHx/Family Hx: As above, otherwise see below   No pertinent past medical history        Social Hx:  No current use of tobacco, alcohol, or illicit drugs  Lives with ***    Medications:  MEDICATIONS  (STANDING):  sodium chloride 0.9% lock flush 3 milliLiter(s) IV Push every 8 hours    MEDICATIONS  (PRN):      Vitals:  T(C): 36.6 (25 @ 13:42), Max: 36.6 (25 @ 13:19)  HR: 81 (25 @ 17:36) (78 - 104)  BP: 115/68 (25 @ 17:36) (106/59 - 139/84)  RR: 16 (25 @ 13:42) (16 - 16)  SpO2: 98% (25 @ 15:53) (96% - 100%)    Physical Examination:  General - NAD, pleasant, cooperative   Cardiovascular - Peripheral pulses palpable, no edema  Neurologic Exam:  Mental status - Awake, Alert, Oriented to person, place, and time. Speech fluent, repetition and naming intact. Follows simple and complex commands. Attention/concentration, recent and remote memory (including registration and recall), and fund of knowledge intact    Cranial nerves:  CN II: Visual fields are full to confrontation. Fundoscopic exam not done. Pupils are 4 mm and briskly reactive to light.   CN III, IV, VI: EOMI, no nystagmus, no ptosis  CN V: Facial sensation is intact to light touch in all 3 divisions bilaterally.  CN VII: Face is symmetric with normal eye closure and smile.  CN VII: Hearing is normal to rubbing fingers  CN IX, X: Palate elevates symmetrically. Phonation is normal.  CN XI: Head turning and shoulder shrug are intact  CN XII: Tongue is midline with normal movements and no atrophy.    Motor - Normal bulk and tone throughout. No pronator drift of out-stretched arms.  Strength testing          R/L:   Deltoid(C5) 5/5  Biceps(C6) 5/5   Triceps(C7) 5/5    Wrist Extension 5/5   Wrist Flexion (C8) 5/5    Interossei  (T1) 5/5     5/5                      R/L: Hip Flexion(L2/3) 5/5   Hip Extension (L4/5) 5/5  Knee Flexion (L4/5/S1) 5/5   Knee Extension (L3/4) 5/5  Dorsiflexion (L4/5) 5/5  Plantar Flexion (S1) 5/5  Sensation - Light touch intact throughout    DTR's -             Biceps      Triceps     Brachioradialis      Patellar    Ankle    Toes/plantar response  R             2+             2+                  2+                2+            2+                 Down  L              2+             2+                 2+                 2+           2+                 Down    Coordination - Rapid alternating movements and fine finger movements are intact. There is no dysmetria on finger-to-nose and heel-knee-shin. There are no abnormal or extraneous movements.   Romberg- not done due to pregnancy and safety concern    Gait and station - not done due to pregnancy and safety concern      Labs:                        10.5   10. )-----------( 257      ( 28 Mar 2025 14:25 )             32.5         136  |  103  |  12  ----------------------------<  82  4.3   |  21[L]  |  0.48[L]    Ca    9.3      28 Mar 2025 14:25    TPro  7.5  /  Alb  3.8  /  TBili  0.2  /  DBili  x   /  AST  15  /  ALT  9   /  AlkPhos  196[H]      CAPILLARY BLOOD GLUCOSE        LIVER FUNCTIONS - ( 28 Mar 2025 14:25 )  Alb: 3.8 g/dL / Pro: 7.5 g/dL / ALK PHOS: 196 U/L / ALT: 9 U/L / AST: 15 U/L / GGT: x               CSF:                  Radiology:

## 2025-03-28 NOTE — OB PROVIDER TRIAGE NOTE - NSHPPHYSICALEXAM_GEN_ALL_CORE
GENERAL: pt in  NAD,   HEAD:  Atraumatic, normocephalic  EYES:PERRLA, conjunctiva and sclera clear  NECK: Supple, nontender   HEART: Regular rate and rhythm,   LUNGS: Unlabored respirations.  Clear to auscultation bilaterally   ABDOMEN: Soft, nontender, gravid           scan  ABD      images saved to ASOb    EXTREMITIES: 2+ peripheral pulses bilaterally. No clubbing, cyanosis, or edema  NERVOUS SYSTEM:  A&Ox3, moving all extremities, no focal deficits   SKIN: No rashes or lesions    vitals   T(C): 36.6 (03-28-25 @ 13:42), Max: 36.6 (03-28-25 @ 13:19)  HR: 95 (03-28-25 @ 14:00) (85 - 95)  BP: 115/68 (03-28-25 @ 14:00) (115/68 - 139/84)  RR: 16 (03-28-25 @ 13:42) (16 - 16)  SpO2: 100% (03-28-25 @ 13:19) (100% - 100%)    NST  Baseline  (          ) BPM  Variability (  )  Moderate   (  ) Minimal  (  ) Absent  (  )  Marked  Accelerations (  ) 15x15   (  ) 10x10  (  ) no  Decelerations (  ) no  (  ) Variable  (  ) Early  (  ) Late      Description _________  Contractions (  ) no  (  ) yes     Description  __________  Interpretation (  ) reactive   (  )  non-reactive GENERAL: pt in  NAD,   HEAD:  Atraumatic, normocephalic  EYES: PERRLA, conjunctiva and sclera clear  NECK: Supple, nontender   HEART: Regular rate and rhythm,   LUNGS: Unlabored respirations.  Clear to auscultation bilaterally   ABDOMEN: Soft, nontender, gravid           scan  ABD      images saved to ASOb    EXTREMITIES: 2+ peripheral pulses bilaterally. No clubbing, cyanosis, or edema  NERVOUS SYSTEM:  A&Ox3, moving all extremities, no focal deficits   SKIN: No rashes or lesions    vitals   T(C): 36.6 (03-28-25 @ 13:42), Max: 36.6 (03-28-25 @ 13:19)  HR: 95 (03-28-25 @ 14:00) (85 - 95)  BP: 115/68 (03-28-25 @ 14:00) (115/68 - 139/84)  RR: 16 (03-28-25 @ 13:42) (16 - 16)  SpO2: 100% (03-28-25 @ 13:19) (100% - 100%)    NST  Baseline  (        140  ) BPM  Variability ( x )  Moderate   (  ) Minimal  (  ) Absent  (  )  Marked  Accelerations (  x) 15x15   (  ) 10x10  (  ) no  Decelerations ( x ) no  (  ) Variable  (  ) Early  (  ) Late      Description _________  Contractions (  ) no  ( x ) yes     Description  ___irregular denies any c/o uterine contractions_______  Interpretation ( x ) reactive   (  )  non-reactive GENERAL: pt in  NAD,   HEAD:  Atraumatic, normocephalic  EYES: PERRLA, conjunctiva and sclera clear  NECK: Supple, nontender   HEART: Regular rate and rhythm,   LUNGS: Unlabored respirations.  Clear to auscultation bilaterally   ABDOMEN: Soft, nontender, gravid           scan  ABD   TAS BPP ; 8/8 cephalic MAJOR: 15.27   reports saved in ASOB    images saved to ASOb    EXTREMITIES: 2+ peripheral pulses bilaterally. No clubbing, cyanosis, or edema  NERVOUS SYSTEM:  A&Ox3, moving all extremities, no focal deficits   SKIN: No rashes or lesions    vitals   T(C): 36.6 (03-28-25 @ 13:42), Max: 36.6 (03-28-25 @ 13:19)  HR: 95 (03-28-25 @ 14:00) (85 - 95)  BP: 115/68 (03-28-25 @ 14:00) (115/68 - 139/84)  RR: 16 (03-28-25 @ 13:42) (16 - 16)  SpO2: 100% (03-28-25 @ 13:19) (100% - 100%)    NST  Baseline  (        140  ) BPM  Variability ( x )  Moderate   (  ) Minimal  (  ) Absent  (  )  Marked  Accelerations (  x) 15x15   (  ) 10x10  (  ) no  Decelerations ( x ) no  (  ) Variable  (  ) Early  (  ) Late      Description _________  Contractions (  ) no  ( x ) yes     Description  ___irregular denies any c/o uterine contractions_______  Interpretation ( x ) reactive   (  )  non-reactive GENERAL: pt in  NAD,   HEAD:  Atraumatic, normocephalic  EYES: PERRLA, conjunctiva and sclera clear  NECK: Supple, nontender   HEART: Regular rate and rhythm,   LUNGS: Unlabored respirations.  Clear to auscultation bilaterally   ABDOMEN: Soft, nontender, gravid           scan  ABD   TAS BPP ; 8/8 cephalic MAJOR: 15.27 anterior placenta   reports saved in ASOB    images saved to ASOb    EXTREMITIES: 2+ peripheral pulses bilaterally. No clubbing, cyanosis, or edema  NERVOUS SYSTEM:  A&Ox3, moving all extremities, no focal deficits   SKIN: No rashes or lesions    vitals   T(C): 36.6 (03-28-25 @ 13:42), Max: 36.6 (03-28-25 @ 13:19)  HR: 95 (03-28-25 @ 14:00) (85 - 95)  BP: 115/68 (03-28-25 @ 14:00) (115/68 - 139/84)  RR: 16 (03-28-25 @ 13:42) (16 - 16)  SpO2: 100% (03-28-25 @ 13:19) (100% - 100%)    NST  Baseline  (        140  ) BPM  Variability ( x )  Moderate   (  ) Minimal  (  ) Absent  (  )  Marked  Accelerations (  x) 15x15   (  ) 10x10  (  ) no  Decelerations ( x ) no  (  ) Variable  (  ) Early  (  ) Late      Description _________  Contractions (  ) no  ( x ) yes     Description  ___irregular denies any c/o uterine contractions_______  Interpretation ( x ) reactive   (  )  non-reactive

## 2025-03-28 NOTE — OB PROVIDER TRIAGE NOTE - NSHPLABSRESULTS_GEN_ALL_CORE
PEC labs PEC labs  CBC Full  -  ( 28 Mar 2025 14:25 )  WBC Count : 10.22 K/uL  RBC Count : 3.60 M/uL  Hemoglobin : 10.5 g/dL  Hematocrit : 32.5 %  Platelet Count - Automated : 257 K/uL  Mean Cell Volume : 90.3 fL  Mean Cell Hemoglobin : 29.2 pg  Mean Cell Hemoglobin Concentration : 32.3 g/dL  Auto Neutrophil # : x  Auto Lymphocyte # : x  Auto Monocyte # : x  Auto Eosinophil # : x  Auto Basophil # : x  Auto Neutrophil % : x  Auto Lymphocyte % : x  Auto Monocyte % : x  Auto Eosinophil % : x  Auto Basophil % : x        136  |  103  |  12  ----------------------------<  82  4.3   |  21[L]  |  0.48[L]    Ca    9.3      28 Mar 2025 14:25    TPro  7.5  /  Alb  3.8  /  TBili  0.2  /  DBili  x   /  AST  15  /  ALT  9   /  AlkPhos  196[H]      uric acid: 4.0  LDH: 146     Urinalysis Basic - ( 28 Mar 2025 14:25 )    Color: Yellow / Appearance: Clear / S.023 / pH: x  Gluc: 82 mg/dL / Ketone: Negative mg/dL  / Bili: Negative / Urobili: 0.2 mg/dL   Blood: x / Protein: Trace mg/dL / Nitrite: Negative   Leuk Esterase: Negative / RBC: x / WBC x   Sq Epi: x / Non Sq Epi: x / Bacteria: x    PCR: 0.1

## 2025-03-28 NOTE — OB PROVIDER TRIAGE NOTE - HISTORY OF PRESENT ILLNESS
25 y/o  @ 36.5 wks gestation presents with c/o headache left side to back of head states has been 3 days last took Tylenol 1000 mg @ 1230 with no relief , states her pain is 7/10 on pain scale denies any visual disturbances  or right upper epigastric pain denies any n/v/d denies any fever or chills denies any uc's vb or lof reports +FM ap care comp by :   IVF pregnancy secondary to left salpingectomy / ectopic pregnancy

## 2025-03-28 NOTE — OB RN TRIAGE NOTE - NSICDXPASTSURGICALHX_GEN_ALL_CORE_FT
PAST SURGICAL HISTORY:  History of salpingectomy      PAST SURGICAL HISTORY:  H/O small bowel obstruction     History of salpingectomy

## 2025-03-28 NOTE — CONSULT NOTE ADULT - ATTENDING COMMENTS
Mrs. Washington is a 24-year-old woman at 36+weeks presenting with left sided throbbing headache for 3 days. Denies thunderclap onset. No vision changes. No positional component, no tinitus. Symptoms near resolved with analgesia. Suspect benign primary headache like tension type vs migraine.       -On discharge cane take Acetaminophen 650 mg orally Q6H PRN as needed for headache  - No need for further neurological workup at this moment as her symptoms has improved with Tylenol  - Please return to ER if any sudden severe headache, new neurological deficit, or blurry vision

## 2025-03-28 NOTE — OB PROVIDER TRIAGE NOTE - NS_OBGYNHISTORY_OBGYN_ALL_OB_FT
OB HX :   2023 L Salpingectomy(ectopic) 6 months after states had small bowel obstruction   GYN HX:   left ectopic pregnancy - left salpingectomy

## 2025-03-28 NOTE — CONSULT NOTE ADULT - ASSESSMENT
ASSESSMENT   24-year-ol female came with left sided throbbing headache. Physical exam non-focal without any neurological deficit. No red flag of headache in the history or exam. Headache got better after getting Acetaminophen and Reglan.     IMPRESSION   Unilateral throbbing headache, now improved after getting migraine cocktail, likely due to first attack of migraine triggered by pregnancy or cervicogenic headache.     RECOMMENDATION   Headache management:  Meds:   [] 1st line (can be given together, Q8HR PRN for HA): Metoclopramide 10mg IV, Benadryl 25mg IV, Tylenol 1000mg)  ***Can replace Reglan with Prochlorperazine 10mg IV  ***Can use Ondansetron 8mg IV if DA-R antagonist contraindicated  ***Consider MgSO4 1g if migraine with aura (CI in NM dz, renal impairment, AVB, pregnancy)    -On discharge cane take Acetaminophen 650 mg orally Q6H PRN as needed for headache  - No need for further neurological workup at this moment as her symptoms has improved with Tylenol  - Please return to ER if any suddent severe headache, new neurological deficit, or blurry vision   Other:  - Darken/quiet room  - Encourage avoidance of common migraine triggers (artificial sweeteners, food preservative (MSG), aged cheese, skipping meals, change in wake/sleep cycle and intense physical exertion)  - Encourage lifestyle changes that help migraine: physical exercise, fixed meal time, fixed sleep/wake cycle, avoid smoking and highly caffeinated products    - Patient can follow up with headache clinic at [address] [place] [address] 1-2 weeks after discharge. Please instruct the patient to call [phone] to schedule this appointment.  - If pt is without insurance, patient can follow up with [facility], located in [address] [place] [address] at [address] [place] [address]. Patient/family can call [phone] to schedule this appointment.    Discussed with neurology attending Dr. Balbuena    Plan discussed with the primary team  Note finalized upon attending attestation.  ASSESSMENT   24-year-old 36+weeks of pregnant lady came with left sided throbbing headache. Physical exam non-focal without any neurological deficit. No red flag of headache in the history or exam. Headache got better after getting Acetaminophen and Reglan.     IMPRESSION   Unilateral throbbing headache, now improved after getting migraine cocktail, likely due to first attack of migraine triggered by pregnancy or cervicogenic headache.     RECOMMENDATION   Headache management:  Meds:   [] 1st line (can be given together, Q8HR PRN for HA): Metoclopramide 10mg IV, Benadryl 25mg IV, Tylenol 1000mg)  ***Can replace Reglan with Prochlorperazine 10mg IV  ***Can use Ondansetron 8mg IV if DA-R antagonist contraindicated  ***Consider MgSO4 1g if migraine with aura (CI in NM dz, renal impairment, AVB, pregnancy)    -On discharge cane take Acetaminophen 650 mg orally Q6H PRN as needed for headache  - No need for further neurological workup at this moment as her symptoms has improved with Tylenol  - Please return to ER if any suddent severe headache, new neurological deficit, or blurry vision   Other:  - Darken/quiet room  - Encourage avoidance of common migraine triggers (artificial sweeteners, food preservative (MSG), aged cheese, skipping meals, change in wake/sleep cycle and intense physical exertion)  - Encourage lifestyle changes that help migraine: physical exercise, fixed meal time, fixed sleep/wake cycle, avoid smoking and highly caffeinated products    - Patient can follow up with headache clinic at [address] [place] [address] 1-2 weeks after discharge. Please instruct the patient to call [phone] to schedule this appointment.  - If pt is without insurance, patient can follow up with [facility], located in [address] [place] [address] at [address] [place] [address]. Patient/family can call [phone] to schedule this appointment.    Discussed with neurology attending Dr. Balbuena    Plan discussed with the primary team  Note finalized upon attending attestation.

## 2025-03-28 NOTE — OB PROVIDER TRIAGE NOTE - NSOBPROVIDERNOTE_OBGYN_ALL_OB_FT
saline lock  Pepcid 20 mg IVP  Reglan 10 mg IVP  Benadryl 25 mg IVP  Tylenol 1000 mg po   BP monitoring   PEC labs   will continue to monitor saline lock  Pepcid 20 mg IVP @ 1435   Reglan 10 mg IVP @ 1445  Benadryl 25 mg IVP @ 1441   Tylenol 1000 mg po @ 1405  BP monitoring   PEC labs   will continue to monitor  pt reports still has  headache @ this time but her pain is 2-3/10 , states is better   pt states " good" relief   no evidence of PEC   likely with a migraine headache   maternal and fetal status reassuring saline lock  Pepcid 20 mg IVP @ 1435   Reglan 10 mg IVP @ 1445  Benadryl 25 mg IVP @ 1441   Tylenol 1000 mg po @ 1405  BP monitoring   PEC labs   will continue to monitor  pt reports still has  headache @ this time but her pain is 2-3/10 , states is better   pt states " good" relief   no evidence of PEC   likely with a migraine headache   plan of care d/w dr sheets  pt for neuro consult   d/w dr Bernal   pt to be evaluated by neurology   will continue to monitor saline lock  Pepcid 20 mg IVP @ 1435   Reglan 10 mg IVP @ 1445  Benadryl 25 mg IVP @ 1441   Tylenol 1000 mg po @ 1405  BP monitoring   PEC labs   will continue to monitor  pt reports still has  headache @ this time but her pain is 2-3/10 , states is better   pt states " good" relief   no evidence of PEC   likely with a migraine headache   plan of care d/w dr sheets  pt for neuro consult   d/w dr Bernal   pt to be evaluated by neurology   will continue to monitor  1715 Dr Bernal in attendance to evaluate patient saline lock  Pepcid 20 mg IVP @ 1435   Reglan 10 mg IVP @ 1445  Benadryl 25 mg IVP @ 1441   Tylenol 1000 mg po @ 1405  BP monitoring   PEC labs   will continue to monitor  pt reports still has  headache @ this time but her pain is 2-3/10 , states is better   pt states " good" relief   no evidence of PEC   likely with a migraine headache   plan of care d/w dr sheets  pt for neuro consult   d/w dr Bernal   pt to be evaluated by neurology   will continue to monitor  1715 Dr Bernal in attendance to evaluate patient  1730 : as per Dr Bernal pt likely with migraine headache and patient headache relieved with Tylenol   pt may take Tylenol 1000 mg po every 6 hours for headache as needed   pt to follow up with outpatient neurology   as per Dr Bernal pt okay to be discharged home saline lock  Pepcid 20 mg IVP @ 1435   Reglan 10 mg IVP @ 1445  Benadryl 25 mg IVP @ 1441   Tylenol 1000 mg po @ 1405  BP monitoring   PEC labs   will continue to monitor  pt reports still has  headache @ this time but her pain is 2-3/10 , states is better   pt states " good" relief   no evidence of PEC   likely with a migraine headache   plan of care d/w dr corral  pt for neuro consult   d/w dr Bernal   pt to be evaluated by neurology   will continue to monitor  1715 Dr Bernal in attendance to evaluate patient  1730 : as per Dr Bernal pt likely with migraine headache and patient headache relieved with Tylenol   pt may take Tylenol 1000 mg po every 6 hours for headache as needed   pt to follow up with outpatient neurology   as per Dr Bernal pt okay to be discharged home  1745 plan of care d/w dr Corral   likely with migraine headache   maternal and fetal status reassuring   pt to be discharged home   discharge home   PTL precautions d/w pt  increase fluid intake  instructed on fetal kickcounts   pt to follow up with neurology outpatient   pt may take Tylenol 1000 mg every 6 hours as needed for headache next dose @ 805 PM   follow up with dr corral as sched  w/v discharge instructions given  discharged home     discharge @ 1758

## 2025-03-28 NOTE — OB PROVIDER TRIAGE NOTE - ADDITIONAL INSTRUCTIONS
likely with migraine headache   maternal and fetal status reassuring   pt to be discharged home   discharge home   PTL precautions d/w pt  increase fluid intake  instructed on fetal kickcounts   pt to follow up with neurology outpatient   pt may take Tylenol 1000 mg every 6 hours as needed for headache next dose @ 805 PM   follow up with dr sheets as sched  w/v discharge instructions given  discharged home     discharge @ 9413

## 2025-03-28 NOTE — OB RN TRIAGE NOTE - FALL HARM RISK - UNIVERSAL INTERVENTIONS
Bed in lowest position, wheels locked, appropriate side rails in place/Call bell, personal items and telephone in reach/Instruct patient to call for assistance before getting out of bed or chair/Non-slip footwear when patient is out of bed/[place] to call system/Physically safe environment - no spills, clutter or unnecessary equipment/Purposeful Proactive Rounding/Room/bathroom lighting operational, light cord in reach

## 2025-04-01 DIAGNOSIS — G43.909 MIGRAINE, UNSPECIFIED, NOT INTRACTABLE, WITHOUT STATUS MIGRAINOSUS: ICD-10-CM

## 2025-04-01 DIAGNOSIS — O09.13 SUPERVISION OF PREGNANCY WITH HISTORY OF ECTOPIC PREGNANCY, THIRD TRIMESTER: ICD-10-CM

## 2025-04-01 DIAGNOSIS — O99.353 DISEASES OF THE NERVOUS SYSTEM COMPLICATING PREGNANCY, THIRD TRIMESTER: ICD-10-CM

## 2025-04-01 DIAGNOSIS — O09.813 SUPERVISION OF PREGNANCY RESULTING FROM ASSISTED REPRODUCTIVE TECHNOLOGY, THIRD TRIMESTER: ICD-10-CM

## 2025-04-01 DIAGNOSIS — Z3A.36 36 WEEKS GESTATION OF PREGNANCY: ICD-10-CM

## 2025-04-02 ENCOUNTER — ASOB RESULT (OUTPATIENT)
Age: 25
End: 2025-04-02

## 2025-04-02 ENCOUNTER — APPOINTMENT (OUTPATIENT)
Dept: OBGYN | Facility: CLINIC | Age: 25
End: 2025-04-02
Payer: COMMERCIAL

## 2025-04-02 ENCOUNTER — APPOINTMENT (OUTPATIENT)
Dept: ANTEPARTUM | Facility: CLINIC | Age: 25
End: 2025-04-02
Payer: COMMERCIAL

## 2025-04-02 VITALS
HEART RATE: 105 BPM | BODY MASS INDEX: 32.14 KG/M2 | WEIGHT: 200 LBS | SYSTOLIC BLOOD PRESSURE: 122 MMHG | DIASTOLIC BLOOD PRESSURE: 82 MMHG | HEIGHT: 66 IN

## 2025-04-02 DIAGNOSIS — R51.9 OTHER SPECIFIED PREGNANCY RELATED CONDITIONS, THIRD TRIMESTER: ICD-10-CM

## 2025-04-02 DIAGNOSIS — O26.893 OTHER SPECIFIED PREGNANCY RELATED CONDITIONS, THIRD TRIMESTER: ICD-10-CM

## 2025-04-02 LAB
BASOPHILS # BLD AUTO: 0.03 K/UL
BASOPHILS NFR BLD AUTO: 0.3 %
EOSINOPHIL # BLD AUTO: 0.15 K/UL
EOSINOPHIL NFR BLD AUTO: 1.3 %
HCT VFR BLD CALC: 34.7 %
HGB BLD-MCNC: 11.2 G/DL
IMM GRANULOCYTES NFR BLD AUTO: 1 %
LYMPHOCYTES # BLD AUTO: 1.94 K/UL
LYMPHOCYTES NFR BLD AUTO: 16.2 %
MAN DIFF?: NORMAL
MCHC RBC-ENTMCNC: 29.6 PG
MCHC RBC-ENTMCNC: 32.3 G/DL
MCV RBC AUTO: 91.6 FL
MONOCYTES # BLD AUTO: 0.75 K/UL
MONOCYTES NFR BLD AUTO: 6.3 %
NEUTROPHILS # BLD AUTO: 9.01 K/UL
NEUTROPHILS NFR BLD AUTO: 74.9 %
PLATELET # BLD AUTO: 249 K/UL
RBC # BLD: 3.79 M/UL
RBC # FLD: 14.2 %
WBC # FLD AUTO: 12 K/UL

## 2025-04-02 PROCEDURE — 76819 FETAL BIOPHYS PROFIL W/O NST: CPT

## 2025-04-02 PROCEDURE — 59025 FETAL NON-STRESS TEST: CPT

## 2025-04-02 PROCEDURE — 0502F SUBSEQUENT PRENATAL CARE: CPT

## 2025-04-02 PROCEDURE — 76816 OB US FOLLOW-UP PER FETUS: CPT

## 2025-04-03 ENCOUNTER — NON-APPOINTMENT (OUTPATIENT)
Age: 25
End: 2025-04-03

## 2025-04-05 ENCOUNTER — APPOINTMENT (OUTPATIENT)
Dept: MRI IMAGING | Facility: CLINIC | Age: 25
End: 2025-04-05
Payer: COMMERCIAL

## 2025-04-05 ENCOUNTER — RESULT REVIEW (OUTPATIENT)
Age: 25
End: 2025-04-05

## 2025-04-05 ENCOUNTER — OUTPATIENT (OUTPATIENT)
Dept: OUTPATIENT SERVICES | Facility: HOSPITAL | Age: 25
LOS: 1 days | End: 2025-04-05
Payer: COMMERCIAL

## 2025-04-05 DIAGNOSIS — Z90.79 ACQUIRED ABSENCE OF OTHER GENITAL ORGAN(S): Chronic | ICD-10-CM

## 2025-04-05 DIAGNOSIS — O26.893 OTHER SPECIFIED PREGNANCY RELATED CONDITIONS, THIRD TRIMESTER: ICD-10-CM

## 2025-04-05 PROCEDURE — 70551 MRI BRAIN STEM W/O DYE: CPT

## 2025-04-05 PROCEDURE — 70551 MRI BRAIN STEM W/O DYE: CPT | Mod: 26

## 2025-04-08 ENCOUNTER — APPOINTMENT (OUTPATIENT)
Dept: OBGYN | Facility: CLINIC | Age: 25
End: 2025-04-08

## 2025-04-08 VITALS
HEIGHT: 66 IN | DIASTOLIC BLOOD PRESSURE: 80 MMHG | HEART RATE: 91 BPM | BODY MASS INDEX: 32.3 KG/M2 | WEIGHT: 201 LBS | SYSTOLIC BLOOD PRESSURE: 121 MMHG

## 2025-04-08 PROCEDURE — 0502F SUBSEQUENT PRENATAL CARE: CPT

## 2025-04-08 PROCEDURE — 59426 ANTEPARTUM CARE ONLY: CPT

## 2025-04-08 PROCEDURE — 59025 FETAL NON-STRESS TEST: CPT

## 2025-04-16 ENCOUNTER — APPOINTMENT (OUTPATIENT)
Dept: OBGYN | Facility: CLINIC | Age: 25
End: 2025-04-16

## 2025-04-16 VITALS
WEIGHT: 204 LBS | SYSTOLIC BLOOD PRESSURE: 120 MMHG | HEIGHT: 66 IN | HEART RATE: 121 BPM | BODY MASS INDEX: 32.78 KG/M2 | DIASTOLIC BLOOD PRESSURE: 77 MMHG

## 2025-04-16 PROCEDURE — 0502F SUBSEQUENT PRENATAL CARE: CPT

## 2025-04-16 PROCEDURE — 59025 FETAL NON-STRESS TEST: CPT

## 2025-04-17 ENCOUNTER — NON-APPOINTMENT (OUTPATIENT)
Age: 25
End: 2025-04-17

## 2025-04-19 ENCOUNTER — INPATIENT (INPATIENT)
Facility: HOSPITAL | Age: 25
LOS: 2 days | Discharge: HOME | End: 2025-04-22
Attending: OBSTETRICS & GYNECOLOGY | Admitting: OBSTETRICS & GYNECOLOGY
Payer: COMMERCIAL

## 2025-04-19 VITALS
TEMPERATURE: 98 F | DIASTOLIC BLOOD PRESSURE: 72 MMHG | HEART RATE: 92 BPM | SYSTOLIC BLOOD PRESSURE: 125 MMHG | RESPIRATION RATE: 17 BRPM

## 2025-04-19 DIAGNOSIS — Z90.79 ACQUIRED ABSENCE OF OTHER GENITAL ORGAN(S): Chronic | ICD-10-CM

## 2025-04-19 DIAGNOSIS — Z87.19 PERSONAL HISTORY OF OTHER DISEASES OF THE DIGESTIVE SYSTEM: Chronic | ICD-10-CM

## 2025-04-19 RX ORDER — OXYTOCIN-SODIUM CHLORIDE 0.9% IV SOLN 30 UNIT/500ML 30-0.9/5 UT/ML-%
167 SOLUTION INTRAVENOUS
Qty: 30 | Refills: 0 | Status: DISCONTINUED | OUTPATIENT
Start: 2025-04-19 | End: 2025-04-21

## 2025-04-19 RX ORDER — SODIUM CHLORIDE 9 G/1000ML
1000 INJECTION, SOLUTION INTRAVENOUS
Refills: 0 | Status: DISCONTINUED | OUTPATIENT
Start: 2025-04-19 | End: 2025-04-20

## 2025-04-19 RX ORDER — CITRIC ACID/SODIUM CITRATE 300-500 MG
15 SOLUTION, ORAL ORAL EVERY 6 HOURS
Refills: 0 | Status: DISCONTINUED | OUTPATIENT
Start: 2025-04-19 | End: 2025-04-20

## 2025-04-19 NOTE — OB PROVIDER H&P - HISTORY OF PRESENT ILLNESS
Labor and Delivery Admission H&P    Subjective    HPI: 23 yo F  at 39w6d presents for eIOL    +FM   -LOF   -CTXs   -VB. Pt denies any other concerns.    GBS negative   EFW  3555 g extrapolated by sono     PNC:   - IVF pregnancy, embryo transfer 2024   - Positive quant gold - need pp chest xray   - HA in 3rd trimester s/p wnl Brain MRi   ObHx:    - Ectopic pregnancy s/p left LSC salpingectomy   GynHx: Denies hx of fibroids, ovarian cysts, abnml PAP smears, STIs  MedHx: Denies hx of HTN, DM, asthma, thyroid problems, blood clots/bleeding problems, hx of blood transfusions.  Meds: PNV  All: NKDA  PSHx: Denies  FHx: Denies hx of blood clots/bleeding problems  Social: Denies alcohol/tobacco/drug use in pregnancy  Psych: Denies hx of anxiety/depression     – Will accept blood transfusions? Yes     Labor and Delivery Admission H&P    Subjective    HPI: 23 yo F  at 39w6d presents for eIOL.    +FM   -LOF   -CTXs   -VB. Pt denies any other concerns.    GBS negative   EFW  3555 g extrapolated by sono     PNC:   - IVF pregnancy, embryo transfer 2024 of day 5 embryo    - Positive quant gold - need pp chest xray   - HA in 3rd trimester s/p wnl Brain MRI    ObHx:    - Ectopic pregnancy s/p left LSC salpingectomy c/b SBO at [facility]   GynHx: Denies hx of fibroids, ovarian cysts, abnml PAP smears, STIs  MedHx: Denies hx of HTN, DM, asthma, thyroid problems, blood clots/bleeding problems, hx of blood transfusions.  Meds: PNV, VitD3   All: NKDA  PSHx: LSC left salpingectomy   FHx: Denies hx of blood clots/bleeding problems  Social: Denies alcohol/tobacco/drug use in pregnancy  Psych: Denies hx of anxiety/depression     – Will accept blood transfusions? Yes

## 2025-04-19 NOTE — OB RN PATIENT PROFILE - NS_MODEOFARRIVAL_OBGYN_ALL_OB
Car Glycopyrrolate Counseling:  I discussed with the patient the risks of glycopyrrolate including but not limited to skin rash, drowsiness, dry mouth, difficulty urinating, and blurred vision.

## 2025-04-19 NOTE — OB PROVIDER H&P - ASSESSMENT
Plan  - Admit to L+D. Routine Labs. IVF.  - Expectant management/IOL w/  - Fetus: cat 1 tracing. VTX.   - Prenatal issues: none  - GBS   - Pain: IV pain meds/epidural PRN    D/w Dr. Annmarie Lucas, PGY1   Assessment: 23 yo F  at 39w6d presents for eIOL.      Plan  - Admit to L+D. Routine Labs. IVF.  - IOL w/ BC   - Fetus: cat 1 tracing. VTX.   - Prenatal issues: IVF pregnancy, Hx of + quant gold - will require pp CXR   - GBS negative   - Pain: IV pain meds/epidural PRN    D/w Dr. Radha Lucas, PGY1
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
## 2025-04-19 NOTE — OB PROVIDER H&P - NSHPPHYSICALEXAM_GEN_ALL_CORE
Objective  – VS  T(C): 36.6 (04-19-25 @ 23:18)  HR: 92 (04-19-25 @ 23:18)  BP: 125/72 (04-19-25 @ 23:18)  RR: 17 (04-19-25 @ 23:18)  SpO2: --      – PE:   General: NAD   CV: RRR  Pulm: breathing comfortably on RA  Abd: gravid, nontender  Extr: moving all extremities with ease    – FS:   – Spec: pooling, nitrazine, ferning, bleeding  (lesions if patient with HSV2 history)    – Cervical exam:   – FHT: baseline 1, mod variability, +accels, -decels  – Boling: q   min    – Sono: vertex    Assessment    yo F G P  @  presents for Objective  – VS  T(C): 36.6 (04-19-25 @ 23:18)  HR: 92 (04-19-25 @ 23:18)  BP: 125/72 (04-19-25 @ 23:18)  RR: 17 (04-19-25 @ 23:18)  SpO2: --      – PE:   General: NAD   CV: RRR  Pulm: breathing comfortably on RA  Abd: gravid, nontender  Extr: moving all extremities with ease      – Cervical exam: 1/50/-1  – FHT: baseline 145, mod variability, +accels, -decels  – Allisonia: irregular   – Sono: vertex    Assessment    yo F G P  @  presents for Objective  – VS  T(C): 36.6 (04-19-25 @ 23:18)  HR: 92 (04-19-25 @ 23:18)  BP: 125/72 (04-19-25 @ 23:18)  RR: 17 (04-19-25 @ 23:18)  SpO2: --      – PE:   General: NAD   CV: RRR  Pulm: breathing comfortably on RA  Abd: gravid, nontender  Extr: moving all extremities with ease      – Cervical exam: 1/50/-1  – FHT: baseline 145, mod variability, +accels, -decels  – Sour John: irregular   – Sono: vertex

## 2025-04-20 LAB
BASOPHILS # BLD AUTO: 0.03 K/UL — SIGNIFICANT CHANGE UP (ref 0–0.2)
BASOPHILS NFR BLD AUTO: 0.2 % — SIGNIFICANT CHANGE UP (ref 0–2)
BLD GP AB SCN SERPL QL: NEGATIVE — SIGNIFICANT CHANGE UP
EOSINOPHIL # BLD AUTO: 0.12 K/UL — SIGNIFICANT CHANGE UP (ref 0–0.5)
EOSINOPHIL NFR BLD AUTO: 0.9 % — SIGNIFICANT CHANGE UP (ref 0–6)
HCT VFR BLD CALC: 31.3 % — LOW (ref 34.5–45)
HGB BLD-MCNC: 10.3 G/DL — LOW (ref 11.5–15.5)
IANC: 8.86 K/UL — HIGH (ref 1.8–7.4)
IMM GRANULOCYTES NFR BLD AUTO: 0.7 % — SIGNIFICANT CHANGE UP (ref 0–0.9)
LYMPHOCYTES # BLD AUTO: 18.7 % — SIGNIFICANT CHANGE UP (ref 13–44)
LYMPHOCYTES # BLD AUTO: 2.38 K/UL — SIGNIFICANT CHANGE UP (ref 1–3.3)
MCHC RBC-ENTMCNC: 29.3 PG — SIGNIFICANT CHANGE UP (ref 27–34)
MCHC RBC-ENTMCNC: 32.9 G/DL — SIGNIFICANT CHANGE UP (ref 32–36)
MCV RBC AUTO: 89.2 FL — SIGNIFICANT CHANGE UP (ref 80–100)
MONOCYTES # BLD AUTO: 1.23 K/UL — HIGH (ref 0–0.9)
MONOCYTES NFR BLD AUTO: 9.7 % — SIGNIFICANT CHANGE UP (ref 2–14)
NEUTROPHILS # BLD AUTO: 8.86 K/UL — HIGH (ref 1.8–7.4)
NEUTROPHILS NFR BLD AUTO: 69.8 % — SIGNIFICANT CHANGE UP (ref 43–77)
NRBC # BLD AUTO: 0 K/UL — SIGNIFICANT CHANGE UP (ref 0–0)
NRBC # FLD: 0 K/UL — SIGNIFICANT CHANGE UP (ref 0–0)
NRBC BLD AUTO-RTO: 0 /100 WBCS — SIGNIFICANT CHANGE UP (ref 0–0)
PLATELET # BLD AUTO: 230 K/UL — SIGNIFICANT CHANGE UP (ref 150–400)
RBC # BLD: 3.51 M/UL — LOW (ref 3.8–5.2)
RBC # FLD: 14.4 % — SIGNIFICANT CHANGE UP (ref 10.3–14.5)
RH IG SCN BLD-IMP: POSITIVE — SIGNIFICANT CHANGE UP
RH IG SCN BLD-IMP: POSITIVE — SIGNIFICANT CHANGE UP
WBC # BLD: 12.71 K/UL — HIGH (ref 3.8–10.5)
WBC # FLD AUTO: 12.71 K/UL — HIGH (ref 3.8–10.5)

## 2025-04-20 PROCEDURE — 59410 OBSTETRICAL CARE: CPT

## 2025-04-20 RX ORDER — BENZOCAINE 220 MG/G
1 SPRAY, METERED PERIODONTAL EVERY 6 HOURS
Refills: 0 | Status: DISCONTINUED | OUTPATIENT
Start: 2025-04-20 | End: 2025-04-22

## 2025-04-20 RX ORDER — OXYCODONE HYDROCHLORIDE 30 MG/1
5 TABLET ORAL
Refills: 0 | Status: DISCONTINUED | OUTPATIENT
Start: 2025-04-20 | End: 2025-04-22

## 2025-04-20 RX ORDER — PRAMOXINE HCL 1 %
1 GEL (GRAM) TOPICAL EVERY 4 HOURS
Refills: 0 | Status: DISCONTINUED | OUTPATIENT
Start: 2025-04-20 | End: 2025-04-22

## 2025-04-20 RX ORDER — ACETAMINOPHEN 500 MG/5ML
975 LIQUID (ML) ORAL
Refills: 0 | Status: DISCONTINUED | OUTPATIENT
Start: 2025-04-20 | End: 2025-04-22

## 2025-04-20 RX ORDER — IBUPROFEN 200 MG
600 TABLET ORAL EVERY 6 HOURS
Refills: 0 | Status: DISCONTINUED | OUTPATIENT
Start: 2025-04-20 | End: 2025-04-22

## 2025-04-20 RX ORDER — SIMETHICONE 80 MG
80 TABLET,CHEWABLE ORAL EVERY 4 HOURS
Refills: 0 | Status: DISCONTINUED | OUTPATIENT
Start: 2025-04-20 | End: 2025-04-22

## 2025-04-20 RX ORDER — MAGNESIUM HYDROXIDE 400 MG/5ML
30 SUSPENSION ORAL
Refills: 0 | Status: DISCONTINUED | OUTPATIENT
Start: 2025-04-20 | End: 2025-04-22

## 2025-04-20 RX ORDER — PRENATAL 136/IRON/FOLIC ACID 27 MG-1 MG
1 TABLET ORAL DAILY
Refills: 0 | Status: DISCONTINUED | OUTPATIENT
Start: 2025-04-20 | End: 2025-04-22

## 2025-04-20 RX ORDER — MODIFIED LANOLIN 100 %
1 CREAM (GRAM) TOPICAL EVERY 6 HOURS
Refills: 0 | Status: DISCONTINUED | OUTPATIENT
Start: 2025-04-20 | End: 2025-04-22

## 2025-04-20 RX ORDER — WITCH HAZEL LEAF
1 FLUID EXTRACT MISCELLANEOUS EVERY 4 HOURS
Refills: 0 | Status: DISCONTINUED | OUTPATIENT
Start: 2025-04-20 | End: 2025-04-22

## 2025-04-20 RX ORDER — DIPHENHYDRAMINE HCL 12.5MG/5ML
25 ELIXIR ORAL EVERY 6 HOURS
Refills: 0 | Status: DISCONTINUED | OUTPATIENT
Start: 2025-04-20 | End: 2025-04-22

## 2025-04-20 RX ORDER — HYDROCORTISONE 10 MG/G
1 CREAM TOPICAL EVERY 6 HOURS
Refills: 0 | Status: DISCONTINUED | OUTPATIENT
Start: 2025-04-20 | End: 2025-04-22

## 2025-04-20 RX ORDER — OXYTOCIN-SODIUM CHLORIDE 0.9% IV SOLN 30 UNIT/500ML 30-0.9/5 UT/ML-%
SOLUTION INTRAVENOUS
Qty: 30 | Refills: 0 | Status: DISCONTINUED | OUTPATIENT
Start: 2025-04-20 | End: 2025-04-20

## 2025-04-20 RX ORDER — CLOSTRIDIUM TETANI TOXOID ANTIGEN (FORMALDEHYDE INACTIVATED), CORYNEBACTERIUM DIPHTHERIAE TOXOID ANTIGEN (FORMALDEHYDE INACTIVATED), BORDETELLA PERTUSSIS TOXOID ANTIGEN (GLUTARALDEHYDE INACTIVATED), BORDETELLA PERTUSSIS FILAMENTOUS HEMAGGLUTININ ANTIGEN (FORMALDEHYDE INACTIVATED), BORDETELLA PERTUSSIS PERTACTIN ANTIGEN, AND BORDETELLA PERTUSSIS FIMBRIAE 2/3 ANTIGEN 5; 2; 2.5; 5; 3; 5 [LF]/.5ML; [LF]/.5ML; UG/.5ML; UG/.5ML; UG/.5ML; UG/.5ML
0.5 INJECTION, SUSPENSION INTRAMUSCULAR ONCE
Refills: 0 | Status: DISCONTINUED | OUTPATIENT
Start: 2025-04-20 | End: 2025-04-22

## 2025-04-20 RX ORDER — DIBUCAINE 10 MG/G
1 OINTMENT TOPICAL EVERY 6 HOURS
Refills: 0 | Status: DISCONTINUED | OUTPATIENT
Start: 2025-04-20 | End: 2025-04-22

## 2025-04-20 RX ORDER — IBUPROFEN 200 MG
600 TABLET ORAL EVERY 6 HOURS
Refills: 0 | Status: COMPLETED | OUTPATIENT
Start: 2025-04-20 | End: 2026-03-19

## 2025-04-20 RX ORDER — OXYTOCIN-SODIUM CHLORIDE 0.9% IV SOLN 30 UNIT/500ML 30-0.9/5 UT/ML-%
167 SOLUTION INTRAVENOUS
Qty: 30 | Refills: 0 | Status: DISCONTINUED | OUTPATIENT
Start: 2025-04-20 | End: 2025-04-21

## 2025-04-20 RX ORDER — KETOROLAC TROMETHAMINE 30 MG/ML
30 INJECTION, SOLUTION INTRAMUSCULAR; INTRAVENOUS ONCE
Refills: 0 | Status: DISCONTINUED | OUTPATIENT
Start: 2025-04-20 | End: 2025-04-20

## 2025-04-20 RX ADMIN — Medication 600 MILLIGRAM(S): at 17:54

## 2025-04-20 RX ADMIN — Medication 3 MILLILITER(S): at 22:00

## 2025-04-20 RX ADMIN — KETOROLAC TROMETHAMINE 30 MILLIGRAM(S): 30 INJECTION, SOLUTION INTRAMUSCULAR; INTRAVENOUS at 12:14

## 2025-04-20 RX ADMIN — Medication 15 MILLILITER(S): at 00:30

## 2025-04-20 RX ADMIN — Medication 975 MILLIGRAM(S): at 15:50

## 2025-04-20 RX ADMIN — Medication 1 APPLICATION(S): at 01:13

## 2025-04-20 RX ADMIN — OXYTOCIN-SODIUM CHLORIDE 0.9% IV SOLN 30 UNIT/500ML 2 MILLIUNIT(S)/MIN: 30-0.9/5 SOLUTION at 09:03

## 2025-04-20 RX ADMIN — Medication 975 MILLIGRAM(S): at 16:30

## 2025-04-20 RX ADMIN — Medication 975 MILLIGRAM(S): at 21:34

## 2025-04-20 RX ADMIN — Medication 975 MILLIGRAM(S): at 22:10

## 2025-04-20 RX ADMIN — SODIUM CHLORIDE 125 MILLILITER(S): 9 INJECTION, SOLUTION INTRAVENOUS at 01:05

## 2025-04-20 NOTE — OB RN DELIVERY SUMMARY - NS_CORDBLDTYPEA_OBGYN_ALL_OB
Yes
[FreeTextEntry3] : lower cheeks and chin, upper chest, back with pustules, 1 cyst \par diffuse hyperpigmented macules of face, chest, back

## 2025-04-20 NOTE — OB PROVIDER DELIVERY SUMMARY - NSSELHIDDEN_OBGYN_ALL_OB_FT
[NS_DeliveryAttending1_OBGYN_ALL_OB_FT:[email or web address]],[NS_DeliveryAssist1_OBGYN_ALL_OB_FT:[id number]=]

## 2025-04-20 NOTE — OB PROVIDER DELIVERY SUMMARY - NSPROVIDERDELIVERYNOTE_OBGYN_ALL_OB_FT
Spontaneous vaginal delivery of liveborn infant from OA position. Head, shoulders and body delivered easily. Infant was suctioned. 1 minute delayed cord clamping was performed. Cord clamped and cut and infant passed to mother. Placenta delivered. Fundal massage was given and uterine fundus was found to be firm. Vaginal exam revealed an intact cervix, vaginal walls, and sulci. Patient had a 2nd degree laceration in the perineum that was repaired with 2.0 chromic and 3-0 vicryl suture after local anesthetic given. Excellent hemostasis was noted. Patient was stable and went to recovery. Count was correct x2.     Alessia Osman PGY1 Spontaneous vaginal delivery of liveborn infant from OA position. Head, shoulders and body delivered easily. Infant was suctioned. 1 minute delayed cord clamping was performed. Cord clamped and cut and infant passed to mother. Placenta delivered. Fundal massage was given and uterine fundus was found to be firm. Vaginal exam revealed an intact cervix, vaginal walls, and sulci. Patient had a 2nd degree laceration in the perineum that was repaired with 2.0 chromic and 3-0 Vicryl suture, after local anesthetic given. Excellent hemostasis was noted. Patient was stable and went to recovery. Count was correct x2.     Alessia Osman PGY1  C Ellis- attending physician

## 2025-04-20 NOTE — OB RN DELIVERY SUMMARY - NSSELHIDDEN_OBGYN_ALL_OB_FT
[NS_DeliveryAttending1_OBGYN_ALL_OB_FT:[email or web address]],[NS_DeliveryAssist1_OBGYN_ALL_OB_FT:[id number]],[NS_DeliveryRN_OBGYN_ALL_OB_FT:[id number]=]

## 2025-04-20 NOTE — OB PROVIDER DELIVERY SUMMARY - NSLACERATCATEGORY_OBGYN_ALL_OB
Spoke with patient. Two pt identifiers confirmed. Patient states that she was in a  MVA yesterday and it has caused her fibromyalgia to flare-up. Patient states that she needs to be seen today, she is in a lot of pain. Patient offered an appointment today at 2pm.  Patient accepted. Patient advised if anything changes or if unable to keep this appointment to call the office  Pt verbalized understanding of information discussed w/ no further questions at this time.
[employer]

## 2025-04-20 NOTE — OB PROVIDER LABOR PROGRESS NOTE - ASSESSMENT
P0 @40w here for eIOL    - for pit  - sp BC, SROM  - cont to monitor toco/ tracing  - to move to the floor    Dw Dr. Ellis Osman PGY1
P0 @40w here for eIOL    - sp BC/ SROM  - cw pit  - cont to monitor toco/ tracing    Dw Dr. Ellis Osman PGY1

## 2025-04-20 NOTE — OB NEONATOLOGY/PEDIATRICIAN DELIVERY SUMMARY - NSPEDSNEONOTESA_OBGYN_ALL_OB_FT
Pediatrician called to delivery for category 2 tracing. 40 wk AGA male born via  to a 23 y/o  mother.  Maternal history of bowel obstruction w adhesions in . This is an IVF pregnancy. Maternal labs include Blood Type A+, HIV - , Hep C -,  RPR NR , Rubella I , Hep B - , GBS- 3/17. Quantiferon Gold Positive, CXR negative. SROM at 5:12 on  with clear fluids.  Cord clamping was delayed 60 secs. Baby emerged vigorous, crying, was warmed, dried, suctioned, and stimulated with APGARS of 9/9 .  Resuscitation included: deep suction. Mom plans to initiate breastfeeding, consents Hep B vaccine and consents circ.  Highest maternal temp: 36.9. EOS .10. Admitted under Dr. Pam Quick. Baby stable for transfer to  nursery. Parents updated.    :   TOB: 10:26   BW: 3250

## 2025-04-20 NOTE — OB RN DELIVERY SUMMARY - NS_SEPSISRSKCALC_OBGYN_ALL_OB_FT
EOS calculated successfully. EOS Risk Factor: 0.5/1000 live births ([facility] national incidence); GA=40w;Temp=98.42; ROM=5.233; GBS='Negative'; Antibiotics='No antibiotics or any antibiotics < 2 hrs prior to birth'

## 2025-04-21 ENCOUNTER — TRANSCRIPTION ENCOUNTER (OUTPATIENT)
Age: 25
End: 2025-04-21

## 2025-04-21 LAB
HCT VFR BLD CALC: 26.2 % — LOW (ref 34.5–45)
HGB BLD-MCNC: 8.5 G/DL — LOW (ref 11.5–15.5)
T PALLIDUM AB TITR SER: NEGATIVE — SIGNIFICANT CHANGE UP

## 2025-04-21 RX ADMIN — Medication 600 MILLIGRAM(S): at 11:28

## 2025-04-21 RX ADMIN — Medication 3 MILLILITER(S): at 13:04

## 2025-04-21 RX ADMIN — DIBUCAINE 1 APPLICATION(S): 10 OINTMENT TOPICAL at 20:21

## 2025-04-21 RX ADMIN — Medication 600 MILLIGRAM(S): at 18:00

## 2025-04-21 RX ADMIN — Medication 3 MILLILITER(S): at 21:15

## 2025-04-21 RX ADMIN — Medication 600 MILLIGRAM(S): at 12:25

## 2025-04-21 RX ADMIN — Medication 600 MILLIGRAM(S): at 17:15

## 2025-04-21 RX ADMIN — Medication 975 MILLIGRAM(S): at 02:23

## 2025-04-21 RX ADMIN — Medication 600 MILLIGRAM(S): at 23:23

## 2025-04-21 RX ADMIN — Medication 975 MILLIGRAM(S): at 20:20

## 2025-04-21 RX ADMIN — Medication 975 MILLIGRAM(S): at 20:50

## 2025-04-21 RX ADMIN — Medication 975 MILLIGRAM(S): at 03:00

## 2025-04-21 RX ADMIN — HYDROCORTISONE 1 APPLICATION(S): 10 CREAM TOPICAL at 20:21

## 2025-04-21 RX ADMIN — Medication 1 APPLICATION(S): at 20:21

## 2025-04-21 NOTE — DISCHARGE NOTE OB - CARE PLAN
Principal Discharge DX:	Vaginal delivery  Assessment and plan of treatment:	RRLULU -  - Postpartum care   1

## 2025-04-21 NOTE — DISCHARGE NOTE OB - FINANCIAL ASSISTANCE
[facility] provides services at a reduced cost to those who are determined to be eligible through [facility]’s financial assistance program. Information regarding [facility]’s financial assistance program can be found by going to [email or web address] or by calling [phone].

## 2025-04-21 NOTE — PROGRESS NOTE ADULT - ASSESSMENT
Patient POD #1 s/p vaginal delivery. Patient is doing well, ambulating out of bed, voiding, tolerating diet. Denies nausea and headache. Pain is tolerated on current regimen. No residual anesthetic issues or complications noted.    Suni Orantes CRNA
A/P: 23yo PPD#1 s/p .  Patient is stable and doing well post-partum.   - Pain well controlled, continue current pain regimen  - Increase ambulation, SCDs when not ambulating  - Continue regular diet  -Discharge planning for tomorrow    E Sahara GREENE

## 2025-04-21 NOTE — PROGRESS NOTE ADULT - SUBJECTIVE AND OBJECTIVE BOX
OB Attending Progress Note:  PPD#1    S: 23yo  PPD#1 s/p . Patient feels well. Pain is well controlled. She is tolerating a regular diet and passing flatus. She is voiding spontaneously, and ambulating without difficulty. Denies CP/SOB. Denies lightheadedness/dizziness. Denies N/V.    O:  Vitals:  Vital Signs Last 24 Hrs  T(C): 36.9 (2025 06:41), Max: 37.1 (2025 18:01)  HR: 95 (2025 06:41) (85 - 111)  BP: 126/76 (2025 06:41) (106/60 - 135/64)  RR: 18 (2025 06:41) (18 - 20)  SpO2: 98% (2025 06:41) (96% - 100%)    Parameters below as of 2025 06:41  Patient On (Oxygen Delivery Method): room air      Labs:  Blood type: A Positive  Rubella IgG: RPR: Negative                          8.5[L]   -- >-----------< --    (  @ 06:40 )             26.2[L]                        10.3[L]   12.71[H] >-----------< 230    (  @ 23:35 )             31.3[L]                  Physical Exam:  General: NAD  Abdomen: soft, non-tender, non-distended, fundus firm  Vaginal: Lochia wnl  Extremities: No erythema/edema, no calf tenderness b/l

## 2025-04-21 NOTE — DISCHARGE NOTE OB - CARE PROVIDER_API CALL
Janis Corral.  Obstetrics and Gynecology  [address] [place] [address], [address]  [place] [address]  Phone: [phone]  Fax: [phone]  Follow Up Time:

## 2025-04-21 NOTE — DISCHARGE NOTE OB - MATERIALS PROVIDED
Vaccinations/[facility] White Screening Program/White  Immunization Record/Breastfeeding Log/Bottle Feeding Log/Breastfeeding Mother’s Support Group Information/Guide to Postpartum Care/[facility] Hearing Screen Program/Back To Sleep Handout/Shaken Baby Prevention Handout/Breastfeeding Guide and Packet/Birth Certificate Instructions/Discharge Medication Information for Patients and Families Pocket Guide/MMR Vaccination (VIS Pub Date: 2012)/Tdap Vaccination (VIS Pub Date: 2012)

## 2025-04-21 NOTE — DISCHARGE NOTE OB - MEDICATION SUMMARY - MEDICATIONS TO TAKE
I will START or STAY ON the medications listed below when I get home from the hospital:    ibuprofen 600 mg oral tablet  -- 1 tab(s) by mouth every 6 hours  -- Indication: For Vaginal delivery    acetaminophen 325 mg oral tablet  -- 3 tab(s) by mouth every 6 hours as needed for  mild pain  -- Indication: For Vaginal delivery    Prenatal Multivitamins with Folic Acid 1 mg oral tablet  -- 1 tab(s) by mouth once a day  -- Indication: For Vaginal delivery

## 2025-04-22 ENCOUNTER — TRANSCRIPTION ENCOUNTER (OUTPATIENT)
Age: 25
End: 2025-04-22

## 2025-04-22 VITALS
RESPIRATION RATE: 18 BRPM | DIASTOLIC BLOOD PRESSURE: 64 MMHG | HEART RATE: 83 BPM | TEMPERATURE: 98 F | SYSTOLIC BLOOD PRESSURE: 111 MMHG | OXYGEN SATURATION: 97 %

## 2025-04-22 RX ORDER — PRENATAL 136/IRON/FOLIC ACID 27 MG-1 MG
1 TABLET ORAL
Qty: 0 | Refills: 0 | DISCHARGE
Start: 2025-04-22

## 2025-04-22 RX ORDER — IBUPROFEN 200 MG
1 TABLET ORAL
Qty: 0 | Refills: 0 | DISCHARGE
Start: 2025-04-22

## 2025-04-22 RX ORDER — ACETAMINOPHEN 500 MG/5ML
3 LIQUID (ML) ORAL
Qty: 0 | Refills: 0 | DISCHARGE
Start: 2025-04-22

## 2025-04-22 RX ADMIN — Medication 600 MILLIGRAM(S): at 05:36

## 2025-04-22 RX ADMIN — Medication 600 MILLIGRAM(S): at 00:00

## 2025-04-22 RX ADMIN — Medication 3 MILLILITER(S): at 05:02

## 2025-04-22 RX ADMIN — Medication 975 MILLIGRAM(S): at 03:27

## 2025-04-22 RX ADMIN — Medication 600 MILLIGRAM(S): at 06:06

## 2025-04-22 RX ADMIN — Medication 975 MILLIGRAM(S): at 08:30

## 2025-04-22 RX ADMIN — Medication 20 MILLIGRAM(S): at 05:39

## 2025-04-22 RX ADMIN — Medication 975 MILLIGRAM(S): at 02:34

## 2025-04-22 RX ADMIN — Medication 975 MILLIGRAM(S): at 07:56

## 2025-05-28 ENCOUNTER — NON-APPOINTMENT (OUTPATIENT)
Age: 25
End: 2025-05-28

## 2025-05-28 ENCOUNTER — APPOINTMENT (OUTPATIENT)
Dept: OBGYN | Facility: CLINIC | Age: 25
End: 2025-05-28
Payer: COMMERCIAL

## 2025-05-28 VITALS
HEART RATE: 76 BPM | BODY MASS INDEX: 30.86 KG/M2 | DIASTOLIC BLOOD PRESSURE: 66 MMHG | SYSTOLIC BLOOD PRESSURE: 100 MMHG | WEIGHT: 192 LBS | HEIGHT: 66 IN

## 2025-05-28 DIAGNOSIS — N39.0 URINARY TRACT INFECTION, SITE NOT SPECIFIED: ICD-10-CM

## 2025-05-28 LAB
BILIRUB UR QL STRIP: NORMAL
CLARITY UR: CLEAR
COLLECTION METHOD: NORMAL
GLUCOSE UR-MCNC: NORMAL
HCG UR QL: 0.2 EU/DL
HGB UR QL STRIP.AUTO: NORMAL
KETONES UR-MCNC: NORMAL
LEUKOCYTE ESTERASE UR QL STRIP: NORMAL
NITRITE UR QL STRIP: NORMAL
PH UR STRIP: 5.5
PROT UR STRIP-MCNC: NORMAL
SP GR UR STRIP: 1.03

## 2025-05-28 PROCEDURE — 0503F POSTPARTUM CARE VISIT: CPT

## 2025-05-29 LAB
APPEARANCE: CLEAR
BACTERIA UR CULT: NORMAL
BILIRUBIN URINE: NEGATIVE
BLOOD URINE: NEGATIVE
COLOR: YELLOW
GLUCOSE QUALITATIVE U: NEGATIVE MG/DL
KETONES URINE: NEGATIVE MG/DL
LEUKOCYTE ESTERASE URINE: NEGATIVE
NITRITE URINE: NEGATIVE
PH URINE: 5
PROTEIN URINE: NEGATIVE MG/DL
SPECIFIC GRAVITY URINE: 1.02
UROBILINOGEN URINE: 0.2 MG/DL

## 2025-06-11 ENCOUNTER — NON-APPOINTMENT (OUTPATIENT)
Age: 25
End: 2025-06-11

## 2025-07-25 NOTE — DISCHARGE NOTE OB - PATIENT PORTAL LINK FT
16
You can access the FollowMyHealth Patient Portal offered by [facility] by registering at the following website: [email or web address]. By joining [facility]’s FollowMyHealth portal, you will also be able to view your health information using other applications (apps) compatible with our system.